# Patient Record
Sex: MALE | Race: WHITE | NOT HISPANIC OR LATINO | Employment: PART TIME | URBAN - METROPOLITAN AREA
[De-identification: names, ages, dates, MRNs, and addresses within clinical notes are randomized per-mention and may not be internally consistent; named-entity substitution may affect disease eponyms.]

---

## 2017-05-10 ENCOUNTER — APPOINTMENT (OUTPATIENT)
Dept: RADIOLOGY | Facility: MEDICAL CENTER | Age: 32
End: 2017-05-10
Attending: EMERGENCY MEDICINE
Payer: MEDICAID

## 2017-05-10 ENCOUNTER — HOSPITAL ENCOUNTER (EMERGENCY)
Facility: MEDICAL CENTER | Age: 32
End: 2017-05-10
Attending: EMERGENCY MEDICINE
Payer: MEDICAID

## 2017-05-10 VITALS
HEART RATE: 64 BPM | OXYGEN SATURATION: 98 % | SYSTOLIC BLOOD PRESSURE: 107 MMHG | TEMPERATURE: 98.9 F | WEIGHT: 155 LBS | HEIGHT: 65 IN | RESPIRATION RATE: 18 BRPM | DIASTOLIC BLOOD PRESSURE: 74 MMHG | BODY MASS INDEX: 25.83 KG/M2

## 2017-05-10 DIAGNOSIS — S49.91XA ARM INJURY, RIGHT, INITIAL ENCOUNTER: ICD-10-CM

## 2017-05-10 DIAGNOSIS — R56.9 SEIZURE (HCC): ICD-10-CM

## 2017-05-10 LAB
ALBUMIN SERPL BCP-MCNC: 4.1 G/DL (ref 3.2–4.9)
ALBUMIN/GLOB SERPL: 1.2 G/DL
ALP SERPL-CCNC: 82 U/L (ref 30–99)
ALT SERPL-CCNC: 108 U/L (ref 2–50)
ANION GAP SERPL CALC-SCNC: 10 MMOL/L (ref 0–11.9)
APTT PPP: 22.6 SEC (ref 24.7–36)
AST SERPL-CCNC: 54 U/L (ref 12–45)
BASOPHILS # BLD AUTO: 0.5 % (ref 0–1.8)
BASOPHILS # BLD: 0.05 K/UL (ref 0–0.12)
BILIRUB SERPL-MCNC: 0.6 MG/DL (ref 0.1–1.5)
BUN SERPL-MCNC: 14 MG/DL (ref 8–22)
CALCIUM SERPL-MCNC: 10.1 MG/DL (ref 8.5–10.5)
CHLORIDE SERPL-SCNC: 104 MMOL/L (ref 96–112)
CO2 SERPL-SCNC: 25 MMOL/L (ref 20–33)
CREAT SERPL-MCNC: 0.83 MG/DL (ref 0.5–1.4)
EOSINOPHIL # BLD AUTO: 0.07 K/UL (ref 0–0.51)
EOSINOPHIL NFR BLD: 0.7 % (ref 0–6.9)
ERYTHROCYTE [DISTWIDTH] IN BLOOD BY AUTOMATED COUNT: 38.4 FL (ref 35.9–50)
GFR SERPL CREATININE-BSD FRML MDRD: >60 ML/MIN/1.73 M 2
GLOBULIN SER CALC-MCNC: 3.5 G/DL (ref 1.9–3.5)
GLUCOSE SERPL-MCNC: 88 MG/DL (ref 65–99)
HCT VFR BLD AUTO: 47.7 % (ref 42–52)
HGB BLD-MCNC: 16.4 G/DL (ref 14–18)
IMM GRANULOCYTES # BLD AUTO: 0.04 K/UL (ref 0–0.11)
IMM GRANULOCYTES NFR BLD AUTO: 0.4 % (ref 0–0.9)
INR PPP: 0.85 (ref 0.87–1.13)
LYMPHOCYTES # BLD AUTO: 2.59 K/UL (ref 1–4.8)
LYMPHOCYTES NFR BLD: 27 % (ref 22–41)
MCH RBC QN AUTO: 30.7 PG (ref 27–33)
MCHC RBC AUTO-ENTMCNC: 34.4 G/DL (ref 33.7–35.3)
MCV RBC AUTO: 89.2 FL (ref 81.4–97.8)
MONOCYTES # BLD AUTO: 0.89 K/UL (ref 0–0.85)
MONOCYTES NFR BLD AUTO: 9.3 % (ref 0–13.4)
NEUTROPHILS # BLD AUTO: 5.95 K/UL (ref 1.82–7.42)
NEUTROPHILS NFR BLD: 62.1 % (ref 44–72)
NRBC # BLD AUTO: 0 K/UL
NRBC BLD AUTO-RTO: 0 /100 WBC
PLATELET # BLD AUTO: 242 K/UL (ref 164–446)
PMV BLD AUTO: 9.2 FL (ref 9–12.9)
POTASSIUM SERPL-SCNC: 3.9 MMOL/L (ref 3.6–5.5)
PROT SERPL-MCNC: 7.6 G/DL (ref 6–8.2)
PROTHROMBIN TIME: 11.9 SEC (ref 12–14.6)
RBC # BLD AUTO: 5.35 M/UL (ref 4.7–6.1)
SODIUM SERPL-SCNC: 139 MMOL/L (ref 135–145)
WBC # BLD AUTO: 9.6 K/UL (ref 4.8–10.8)

## 2017-05-10 PROCEDURE — 96374 THER/PROPH/DIAG INJ IV PUSH: CPT

## 2017-05-10 PROCEDURE — 73080 X-RAY EXAM OF ELBOW: CPT | Mod: RT

## 2017-05-10 PROCEDURE — 700111 HCHG RX REV CODE 636 W/ 250 OVERRIDE (IP): Performed by: EMERGENCY MEDICINE

## 2017-05-10 PROCEDURE — 85610 PROTHROMBIN TIME: CPT

## 2017-05-10 PROCEDURE — 85025 COMPLETE CBC W/AUTO DIFF WBC: CPT

## 2017-05-10 PROCEDURE — 80053 COMPREHEN METABOLIC PANEL: CPT

## 2017-05-10 PROCEDURE — 700105 HCHG RX REV CODE 258: Performed by: EMERGENCY MEDICINE

## 2017-05-10 PROCEDURE — 85730 THROMBOPLASTIN TIME PARTIAL: CPT

## 2017-05-10 PROCEDURE — 73030 X-RAY EXAM OF SHOULDER: CPT | Mod: RT

## 2017-05-10 PROCEDURE — 99284 EMERGENCY DEPT VISIT MOD MDM: CPT

## 2017-05-10 PROCEDURE — 96361 HYDRATE IV INFUSION ADD-ON: CPT

## 2017-05-10 RX ORDER — LORAZEPAM 2 MG/ML
2 INJECTION INTRAMUSCULAR ONCE
Status: COMPLETED | OUTPATIENT
Start: 2017-05-10 | End: 2017-05-10

## 2017-05-10 RX ORDER — TOPIRAMATE 100 MG/1
100 TABLET, FILM COATED ORAL 2 TIMES DAILY
Qty: 60 TAB | Refills: 0 | Status: SHIPPED | OUTPATIENT
Start: 2017-05-10 | End: 2017-11-01

## 2017-05-10 RX ORDER — SODIUM CHLORIDE 9 MG/ML
1000 INJECTION, SOLUTION INTRAVENOUS ONCE
Status: COMPLETED | OUTPATIENT
Start: 2017-05-10 | End: 2017-05-10

## 2017-05-10 RX ADMIN — SODIUM CHLORIDE 1000 ML: 9 INJECTION, SOLUTION INTRAVENOUS at 16:00

## 2017-05-10 RX ADMIN — LORAZEPAM 2 MG: 2 INJECTION INTRAMUSCULAR; INTRAVENOUS at 16:00

## 2017-05-10 ASSESSMENT — ENCOUNTER SYMPTOMS
CHILLS: 0
SEIZURES: 1
HEADACHES: 1
FEVER: 0

## 2017-05-10 ASSESSMENT — PAIN SCALES - GENERAL: PAINLEVEL_OUTOF10: 8

## 2017-05-10 ASSESSMENT — LIFESTYLE VARIABLES: DO YOU DRINK ALCOHOL: NO

## 2017-05-10 NOTE — DISCHARGE INSTRUCTIONS
Seizure, Adult  A seizure means there is unusual activity in the brain. A seizure can cause changes in attention or behavior. Seizures often cause shaking (convulsions). Seizures often last from 30 seconds to 2 minutes.  HOME CARE   · If you are given medicines, take them exactly as told by your doctor.  · Keep all doctor visits as told.  · Do not swim or drive until your doctor says it is okay.  · Teach others what to do if you have a seizure. They should:  ¨ Lay you on the ground.  ¨ Put a cushion under your head.  ¨ Loosen any tight clothing around your neck.  ¨ Turn you on your side.  ¨ Stay with you until you get better.  GET HELP RIGHT AWAY IF:   · The seizure lasts longer than 2 to 5 minutes.  · The seizure is very bad.  · The person does not wake up after the seizure.  · The person's attention or behavior changes.  Drive the person to the emergency room or call your local emergency services (911 in [place]  MAKE SURE YOU:   · Understand these instructions.  · Will watch your condition.  · Will get help right away if you are not doing well or get worse.     This information is not intended to replace advice given to you by your health care provider. Make sure you discuss any questions you have with your health care provider.     Document Released: 06/05/2009 Document Revised: 03/11/2013 Document Reviewed: 12/05/2012  Elsevier Interactive Patient Education ©2016 Elsevier Inc.

## 2017-05-10 NOTE — ED AVS SNAPSHOT
5/10/2017    Mahamed Alistair Mae  [address] [place] [address]    Dear Mahamed:    [facility] wants to ensure your discharge home is safe and you or your loved ones have had all of your questions answered regarding your care after you leave the hospital.    Below is a list of resources and contact information should you have any questions regarding your hospital stay, follow-up instructions, or active medical symptoms.    Questions or Concerns Regarding… Contact   Medical Questions Related to Your Discharge  (7 days a week, 8am-5pm) Contact a Nurse Care Coordinator   [phone]   Medical Questions Not Related to Your Discharge  (24 hours a day / 7 days a week)  Contact the Nurse Health Line   [phone]    Medications or Discharge Instructions Refer to your discharge packet   or contact your [facility] Primary Care Provider   [phone]   Follow-up Appointment(s) Schedule your appointment via [email or web address]   or contact Scheduling [phone]   Billing Review your statement via [email or web address]  or contact Billing [phone]   Medical Records Review your records via [email or web address]   or contact Medical Records [phone]     You may receive a telephone call within two days of discharge. This call is to make certain you understand your discharge instructions and have the opportunity to have any questions answered. You can also easily access your medical information, test results and upcoming appointments via the [email or web address] free online health management tool. You can learn more and sign up at [email or web address]. For assistance setting up your [email or web address] account, please call [phone].    Once again, we want to ensure your discharge home is safe and that you have a clear understanding of any next steps in your care. If you have any questions or concerns, please do not hesitate to contact us, we are here for you. Thank you for choosing [facility] for your healthcare needs.    Sincerely,    Your [facility] Healthcare Team

## 2017-05-10 NOTE — ED NOTES
Chief Complaint   Patient presents with   • Seizure     witnessed by bystanders, full body seizure x2 while walking. Hx of seizures, may have not have taken topamax this am.    • T-5000 Extremity Pain     right elbow, right hand and right knee pain. CMS intact.     More frequent episodes in past two weeks, has EEG scheduled this week. FSBS 172 per medics. VSS. Chart up for ERP.

## 2017-05-10 NOTE — ED AVS SNAPSHOT
Plutonium Paint Access Code: [email or web address]  Expires: 6/9/2017  4:46 PM    Your email address is not on file at [facility].  Email Addresses are required for you to sign up for Plutonium Paint, please contact [phone] to verify your personal information and to provide your email address prior to attempting to register for Plutonium Paint.    Mahamed Mae  [address] [place] [address]    Plutonium Paint  A secure, online tool to manage your health information     [facility]’s Plutonium Paint® is a secure, online tool that connects you to your personalized health information from the privacy of your home -- day or night - making it very easy for you to manage your healthcare. Once the activation process is completed, you can even access your medical information using the Plutonium Paint benjamin, which is available for free in the Apple Benjamin store or Google Play store.     To learn more about Plutonium Paint, visit [email or web address]    There are two levels of access available (as shown below):   My Chart Features  [facility] Primary Care Doctor [facility]  Specialists [facility]  Urgent  Care Non-[facility] Primary Care Doctor   Email your healthcare team securely and privately 24/7 X X X    Manage appointments: schedule your next appointment; view details of past/upcoming appointments X      Request prescription refills. X      View recent personal medical records, including lab and immunizations X X X X   View health record, including health history, allergies, medications X X X X   Read reports about your outpatient visits, procedures, consult and ER notes X X X X   See your discharge summary, which is a recap of your hospital and/or ER visit that includes your diagnosis, lab results, and care plan X X  X     How to register for Aireumt:  Once your e-mail address has been verified, follow the following steps to sign up for Aireumt.     1. Go to  [email or web address]  2. Click on the Sign Up Now box, which takes you to the New Member Sign Up page. You  will need to provide the following information:  a. Enter your "[employer] Access Code exactly as it appears at the top of this page. (You will not need to use this code after you’ve completed the sign-up process. If you do not sign up before the expiration date, you must request a new code.)   b. Enter your date of birth.   c. Enter your home email address.   d. Click Submit, and follow the next screen’s instructions.  3. Create a Terabitzt ID. This will be your "[employer] login ID and cannot be changed, so think of one that is secure and easy to remember.  4. Create a "[employer] password. You can change your password at any time.  5. Enter your Password Reset Question and Answer. This can be used at a later time if you forget your password.   6. Enter your e-mail address. This allows you to receive e-mail notifications when new information is available in "[employer].  7. Click Sign Up. You can now view your health information.    For assistance activating your "[employer] account, call [phone]

## 2017-05-10 NOTE — ED AVS SNAPSHOT
Home Care Instructions                                                                                                                Mahamed Mae   MRN: [id number]    Department:  [facility], Emergency Dept   Date of Visit:  5/10/2017            [facility], Emergency Dept    [address]    [place] [address]    Phone:  [phone]      You were seen by     Jaylan Hong M.D.      Your Diagnosis Was     Seizure (CMS-[facility])     R56.9       These are the medications you received during your hospitalization from 05/10/2017 1457 to 05/10/2017 1725     Date/Time Order Dose Route Action    05/10/2017 1600 NS infusion 1,000 mL 1,000 mL Intravenous New Bag    05/10/2017 1600 lorazepam (ATIVAN) injection 2 mg 2 mg Intravenous Given      Follow-up Information     1. Follow up with Your neurologist for recheck In 1 week.      Medication Information     Review all of your home medications and newly ordered medications with your primary doctor and/or pharmacist as soon as possible. Follow medication instructions as directed by your doctor and/or pharmacist.     Please keep your complete medication list with you and share with your physician. Update the information when medications are discontinued, doses are changed, or new medications (including over-the-counter products) are added; and carry medication information at all times in the event of emergency situations.               Medication List      START taking these medications        Instructions    Morning Afternoon Evening Bedtime    topiramate 100 MG Tabs   Commonly known as:  TOPAMAX        Take 1 Tab by mouth 2 times a day.   Dose:  100 mg                          ASK your doctor about these medications        Instructions    Morning Afternoon Evening Bedtime    alprazolam 0.25 MG Tabs   Commonly known as:  XANAX        Take 0.25 mg by mouth at bedtime as needed.   Dose:  0.25 mg                        doxycycline 100  MG Tabs   Commonly known as:  VIBRAMYCIN        Take 100 mg by mouth 2 times a day.   Dose:  100 mg                        * hydrocodone-acetaminophen 5-325 MG Tabs per tablet   Commonly known as:  NORCO        Take 1-2 Tabs by mouth every four hours as needed.   Dose:  1-2 Tab                        * hydrocodone-acetaminophen 5-325 MG Tabs per tablet   Commonly known as:  NORCO        Take 1-2 Tabs by mouth every 6 hours as needed.   Dose:  1-2 Tab                        venlafaxine 37.5 MG Tabs   Commonly known as:  EFFEXOR        Take 75 mg by mouth 3 times a day.   Dose:  75 mg                        * Notice:  This list has 2 medication(s) that are the same as other medications prescribed for you. Read the directions carefully, and ask your doctor or other care provider to review them with you.         Where to Get Your Medications      You can get these medications from any pharmacy     Bring a paper prescription for each of these medications    - topiramate 100 MG Tabs            Procedures and tests performed during your visit     APTT    CBC WITH DIFFERENTIAL    COMP METABOLIC PANEL    DX-ELBOW-COMPLETE 3+ RIGHT    DX-SHOULDER 2+ RIGHT    ESTIMATED GFR    IV Saline Lock    PROTHROMBIN TIME        Discharge Instructions       Seizure, Adult  A seizure means there is unusual activity in the brain. A seizure can cause changes in attention or behavior. Seizures often cause shaking (convulsions). Seizures often last from 30 seconds to 2 minutes.  HOME CARE   · If you are given medicines, take them exactly as told by your doctor.  · Keep all doctor visits as told.  · Do not swim or drive until your doctor says it is okay.  · Teach others what to do if you have a seizure. They should:  ¨ Lay you on the ground.  ¨ Put a cushion under your head.  ¨ Loosen any tight clothing around your neck.  ¨ Turn you on your side.  ¨ Stay with you until you get better.  GET HELP RIGHT AWAY IF:   · The seizure lasts longer than 2  to 5 minutes.  · The seizure is very bad.  · The person does not wake up after the seizure.  · The person's attention or behavior changes.  Drive the person to the emergency room or call your local emergency services (911 in [place]  MAKE SURE YOU:   · Understand these instructions.  · Will watch your condition.  · Will get help right away if you are not doing well or get worse.     This information is not intended to replace advice given to you by your health care provider. Make sure you discuss any questions you have with your health care provider.     Document Released: 06/05/2009 Document Revised: 03/11/2013 Document Reviewed: 12/05/2012  wedgies Interactive Patient Education ©2016 [employer]gies Inc.            Patient Information     Patient Information    Following emergency treatment: all patient requiring follow-up care must return either to a private physician or a clinic if your condition worsens before you are able to obtain further medical attention, please return to the emergency room.     Billing Information    At [facility], we work to make the billing process streamlined for our patients.  Our Representatives are here to answer any questions you may have regarding your hospital bill.  If you have insurance coverage and have supplied your insurance information to us, we will submit a claim to your insurer on your behalf.  Should you have any questions regarding your bill, we can be reached online or by phone as follows:  Online: You are able pay your bills online or live chat with our representatives about any billing questions you may have. We are here to help Monday - Friday from 8:00am to 7:30pm and 9:00am - 12:00pm on Saturdays.  Please visit [email or web address]  for more information.   Phone:  [phone] or [phone]    Please note that your emergency physician, surgeon, pathologist, radiologist, anesthesiologist, and other specialists are not employed by  [facility] and will therefore bill separately for their services.  Please contact them directly for any questions concerning their bills at the numbers below:     Emergency Physician Services:  [phone]  [facility]:  [phone]  [facility]:  [phone]  [facility]:  [phone]    1. Your final bill may vary from the amount quoted upon discharge if all procedures are not complete at that time, or if your doctor has additional procedures of which we are not aware. You will receive an additional bill if you return to the Emergency Department at [facility] for suture removal regardless of the facility of which the sutures were placed.     2. Please arrange for settlement of this account at the emergency registration.    3. All self-pay accounts are due in full at the time of treatment.  If you are unable to meet this obligation then payment is expected within 4-5 days.     4. If you have had radiology studies (CT, X-ray, Ultrasound, MRI), you have received a preliminary result during your emergency department visit. Please contact the radiology department [phone] to receive a copy of your final result. Please discuss the Final result with your primary physician or with the follow up physician provided.     Crisis Hotline:  [place] Crisis Hotline:  0-604-QCBZIDJ or 1-727.755.3470  [place] Crisis Hotline:    1-273.838.3361 or 582-753-8535         ED Discharge Follow Up Questions    1. In order to provide you with very good care, we would like to follow up with a phone call in the next few days.  May we have your permission to contact you?     YES /  NO    2. What is the best phone number to call you? (       )_____-__________    3. What is the best time to call you?      Morning  /  Afternoon  /  Evening                   Patient Signature:  ____________________________________________________________    Date:   ____________________________________________________________

## 2017-05-11 NOTE — ED NOTES
DC paperwork reviewed and signed.  1 prescription(s) given to patient. Pt denies any further questions at this time; pt ambulated independently to lobby with significant other/family

## 2017-05-16 ENCOUNTER — APPOINTMENT (OUTPATIENT)
Dept: RADIOLOGY | Facility: MEDICAL CENTER | Age: 32
End: 2017-05-16
Attending: EMERGENCY MEDICINE
Payer: MEDICAID

## 2017-05-16 ENCOUNTER — HOSPITAL ENCOUNTER (EMERGENCY)
Facility: MEDICAL CENTER | Age: 32
End: 2017-05-16
Attending: EMERGENCY MEDICINE
Payer: MEDICAID

## 2017-05-16 VITALS
BODY MASS INDEX: 26.33 KG/M2 | HEART RATE: 59 BPM | OXYGEN SATURATION: 96 % | RESPIRATION RATE: 14 BRPM | SYSTOLIC BLOOD PRESSURE: 111 MMHG | HEIGHT: 65 IN | WEIGHT: 158 LBS | DIASTOLIC BLOOD PRESSURE: 72 MMHG

## 2017-05-16 DIAGNOSIS — R56.9 SEIZURE (HCC): ICD-10-CM

## 2017-05-16 LAB
ANION GAP SERPL CALC-SCNC: 7 MMOL/L (ref 0–11.9)
BUN SERPL-MCNC: 19 MG/DL (ref 8–22)
CALCIUM SERPL-MCNC: 9.2 MG/DL (ref 8.5–10.5)
CHLORIDE SERPL-SCNC: 110 MMOL/L (ref 96–112)
CO2 SERPL-SCNC: 20 MMOL/L (ref 20–33)
CREAT SERPL-MCNC: 1.06 MG/DL (ref 0.5–1.4)
GFR SERPL CREATININE-BSD FRML MDRD: >60 ML/MIN/1.73 M 2
GLUCOSE SERPL-MCNC: 80 MG/DL (ref 65–99)
POTASSIUM SERPL-SCNC: 4.1 MMOL/L (ref 3.6–5.5)
SODIUM SERPL-SCNC: 137 MMOL/L (ref 135–145)

## 2017-05-16 PROCEDURE — 70450 CT HEAD/BRAIN W/O DYE: CPT

## 2017-05-16 PROCEDURE — 72125 CT NECK SPINE W/O DYE: CPT

## 2017-05-16 PROCEDURE — 80048 BASIC METABOLIC PNL TOTAL CA: CPT

## 2017-05-16 PROCEDURE — 72070 X-RAY EXAM THORAC SPINE 2VWS: CPT

## 2017-05-16 PROCEDURE — 99284 EMERGENCY DEPT VISIT MOD MDM: CPT

## 2017-05-16 PROCEDURE — 700105 HCHG RX REV CODE 258: Performed by: EMERGENCY MEDICINE

## 2017-05-16 RX ORDER — METHADONE HYDROCHLORIDE 40 MG/1
45 TABLET ORAL DAILY
COMMUNITY
End: 2017-11-01

## 2017-05-16 RX ORDER — LORAZEPAM 2 MG/ML
1 INJECTION INTRAMUSCULAR ONCE
Status: DISCONTINUED | OUTPATIENT
Start: 2017-05-16 | End: 2017-05-16

## 2017-05-16 RX ORDER — ZIPRASIDONE HYDROCHLORIDE 40 MG/1
40 CAPSULE ORAL 2 TIMES DAILY
COMMUNITY
End: 2017-06-21

## 2017-05-16 RX ORDER — SODIUM CHLORIDE 9 MG/ML
1000 INJECTION, SOLUTION INTRAVENOUS ONCE
Status: COMPLETED | OUTPATIENT
Start: 2017-05-16 | End: 2017-05-16

## 2017-05-16 RX ORDER — DIAZEPAM 5 MG/ML
5 INJECTION, SOLUTION INTRAMUSCULAR; INTRAVENOUS ONCE
Status: DISCONTINUED | OUTPATIENT
Start: 2017-05-16 | End: 2017-05-16

## 2017-05-16 RX ADMIN — SODIUM CHLORIDE 1000 ML: 9 INJECTION, SOLUTION INTRAVENOUS at 17:31

## 2017-05-16 ASSESSMENT — PAIN SCALES - GENERAL
PAINLEVEL_OUTOF10: 7
PAINLEVEL_OUTOF10: 7

## 2017-05-16 NOTE — ED PROVIDER NOTES
"ED Provider Note    Scribed for Leighton Sanford M.D. by Vaishali Rose. 5/16/2017, 4:48 PM.    Primary care provider: Pcp Not In Computer  Means of arrival: walk in   History obtained from: patient   History limited by: none     CHIEF COMPLAINT  Chief Complaint   Patient presents with   • Seizure       HPI  Mahamed Mae is a 32 y.o. male with a history of frequent seizures who presents to the Emergency Department in a cervical collar after having an unwitnessed seizure that occurred just prior to arrival. The patient states he was walking down a street when his seizure occurred. He denies urinary incontinence and oral trauma. Patient complains of associated pain between his shoulder blades. Patient takes Topamax for his history of seizures.       REVIEW OF SYSTEMS  Pertinent positives include seizure, back pain.   Pertinent negatives include no urinary incontinence and oral trauma.    All other systems reviewed and negative. C.       PAST MEDICAL HISTORY   has a past medical history of Psychiatric disorder.      SURGICAL HISTORY   has past surgical history that includes hand surgery.      SOCIAL HISTORY  Social History   Substance Use Topics   • Smoking status: Never Smoker    • Smokeless tobacco: Current User     Types: Chew   • Alcohol Use: No      History   Drug Use No       FAMILY HISTORY  History reviewed. No pertinent family history.      CURRENT MEDICATIONS  Home Medications     **Home medications have not yet been reviewed for this encounter**          ALLERGIES  Allergies   Allergen Reactions   • Sulfa Drugs Anaphylaxis and Hives       PHYSICAL EXAM  VITAL SIGNS: /72 mmHg  Pulse 74  Resp 12  Ht 1.651 m (5' 5\")  Wt 71.668 kg (158 lb)  BMI 26.29 kg/m2  Constitutional: Well developed, Well nourished, No acute distress, Non-toxic appearance.   HENT: Normocephalic, Atraumatic, TMs normal, mucous membranes moist, no erythema, exudates, swelling, or masses, nares patent. No tongue laceration.   Eyes: " nonicteric. PERRL.  Neck: Mild diffuse C spine tenderness. In Cervical collar. Supple, no meningismus  Lymphatic: No lymphadenopathy noted.   Cardiovascular: Regular rate and rhythm, no gallops rubs or murmurs  Lungs: Clear bilaterally   Abdomen: Bowel sounds normal, Soft, No tenderness  Skin: Warm, Dry, no rash  Back: Mild T spine tenderness to palpation with no step offs.  No CVA tenderness.   Genitalia: Deferred  Rectal: Deferred  Extremities: No edema  Neurologic: Slightly somnolent but arouses easily, alert and oriented, appropriate, follows commands, moving all extremities equally, normal speech   Psychiatric: Affect normal        DIAGNOSTIC STUDIES / PROCEDURES  LABS  Results for orders placed or performed during the hospital encounter of 05/16/17   BASIC METABOLIC PANEL   Result Value Ref Range    Sodium 137 135 - 145 mmol/L    Potassium 4.1 3.6 - 5.5 mmol/L    Chloride 110 96 - 112 mmol/L    Co2 20 20 - 33 mmol/L    Glucose 80 65 - 99 mg/dL    Bun 19 8 - 22 mg/dL    Creatinine 1.06 0.50 - 1.40 mg/dL    Calcium 9.2 8.5 - 10.5 mg/dL    Anion Gap 7.0 0.0 - 11.9   ESTIMATED GFR   Result Value Ref Range    GFR If African American >60 >60 mL/min/1.73 m 2    GFR If Non African American >60 >60 mL/min/1.73 m 2   All labs reviewed by me.        RADIOLOGY  CT-CSPINE WITHOUT PLUS RECONS   Final Result      No fracture or subluxation is identified.      DX-THORACIC SPINE-2 VIEWS   Final Result      Unremarkable thoracic spine.      CT-HEAD W/O   Final Result      No acute intracranial abnormality is identified.      The radiologist's interpretation of all radiological studies have been reviewed by me.      COURSE & MEDICAL DECISION MAKING  Nursing notes, VS, PMSFHx reviewed in chart.     4:48 PM Patient seen and examined at bedside.  Ordered for CT C spine, CT head, DX thoracic and BMPto evaluate. Patient was treated with Keppra 500 mg  for his symptoms.     6:07 PM Patient reevaluated at bedside. The patient became  agitated and pulled off his cervical collar. Nursing report the patient appeared to have a pseudoseizure with no post ictal period. Patient had no oral trauma or urinary incontinence.     7:25 PM The patient eloped at this time prior to discussion of his lab and imaging results.       Decision Making:  This is a 32 y.o. year old male who presents with an apparent seizure. Here in the department he had an additional episode that was likely a pseudoseizure-it was not witnessed by myself but by his bedside nurse. The patient had images of his head and neck and T-spine which were all normal. After these were obtained the patient eloped prior to discussion of results. It is also noted that his electrolytes are normal. We were unable to give the patient discharge instructions because he eloped prior to this part of the ER stay.    Patient came back for his discharge instructions and I explained the results and advised him to follow-up for his EEG. He will start his Topamax after that and in the interim he'll not be involved in any dangerous activities such as driving etc.     The patient will return for new or worsening symptoms and is stable at the time of discharge.    The patient is referred to a primary physician for blood pressure management, diabetic screening, and for all other preventative health concerns.      DISPOSITION:  Patient eloped in stable condition.       FOLLOW UP:  No follow-up provider specified.      OUTPATIENT MEDICATIONS:  New Prescriptions    No medications on file       FINAL IMPRESSION  1. Seizure (CMS-[facility])           Vaishali GARCIA), am scribing for, and in the presence of, Legihton Sanford M.D..  Electronically signed by: Vaishali Viramontes), 5/16/2017  Leighton GARCIA M.D. personally performed the services described in this documentation, as scribed by Vaishali Rose in my presence, and it is both accurate and complete.    The note accurately reflects work and decisions made by me.  Leighton  Juvenal  5/16/2017  7:32 PM

## 2017-05-16 NOTE — ED AVS SNAPSHOT
Ubisense Access Code: [email or web address]  Expires: 6/9/2017  4:46 PM    Your email address is not on file at [facility].  Email Addresses are required for you to sign up for Ubisense, please contact [phone] to verify your personal information and to provide your email address prior to attempting to register for Ubisense.    Mahamed Mae  [address] [place] [address]    Ubisense  A secure, online tool to manage your health information     [facility] [employer] [facility]’s Ubisense® is a secure, online tool that connects you to your personalized health information from the privacy of your home -- day or night - making it very easy for you to manage your healthcare. Once the activation process is completed, you can even access your medical information using the Ubisense benjamin, which is available for free in the Apple Benjamin store or Google Play store.     To learn more about Ubisense, visit [email or web address]    There are two levels of access available (as shown below):   My Chart Features  [facility] Primary Care Doctor [facility]  Specialists [facility]  Urgent  Care Non-[facility] Primary Care Doctor   Email your healthcare team securely and privately 24/7 X X X    Manage appointments: schedule your next appointment; view details of past/upcoming appointments X      Request prescription refills. X      View recent personal medical records, including lab and immunizations X X X X   View health record, including health history, allergies, medications X X X X   Read reports about your outpatient visits, procedures, consult and ER notes X X X X   See your discharge summary, which is a recap of your hospital and/or ER visit that includes your diagnosis, lab results, and care plan X X  X     How to register for Blink Bookingt:  Once your e-mail address has been verified, follow the following steps to sign up for Blink Bookingt.     1. Go to  [email or web address]  2. Click on the Sign Up Now box, which takes you to the New Member Sign Up page. You  will need to provide the following information:  a. Enter your Reframed.tv Access Code exactly as it appears at the top of this page. (You will not need to use this code after you’ve completed the sign-up process. If you do not sign up before the expiration date, you must request a new code.)   b. Enter your date of birth.   c. Enter your home email address.   d. Click Submit, and follow the next screen’s instructions.  3. Create a H5t ID. This will be your Reframed.tv login ID and cannot be changed, so think of one that is secure and easy to remember.  4. Create a Reframed.tv password. You can change your password at any time.  5. Enter your Password Reset Question and Answer. This can be used at a later time if you forget your password.   6. Enter your e-mail address. This allows you to receive e-mail notifications when new information is available in Reframed.tv.  7. Click Sign Up. You can now view your health information.    For assistance activating your Reframed.tv account, call [phone]

## 2017-05-16 NOTE — ED AVS SNAPSHOT
Home Care Instructions                                                                                                                Mahamed Mae   MRN: [id number]    Department:  [facility], Emergency Dept   Date of Visit:  5/16/2017            [facility], Emergency Dept    [address]    [place] [address]    Phone:  [phone]      You were seen by     Leighton Sanford M.D.      Your Diagnosis Was     Seizure (CMS-HCC)     R56.9       These are the medications you received during your hospitalization from 05/16/2017 1629 to 05/16/2017 1954     Date/Time Order Dose Route Action    05/16/2017 1731 NS infusion 1,000 mL 1,000 mL Intravenous New Bag    05/16/2017 1815 levetiracetam (KEPPRA) 500 mg in D5W 100 mL IVPB 500 mg Intravenous Refused      Medication Information     Review all of your home medications and newly ordered medications with your primary doctor and/or pharmacist as soon as possible. Follow medication instructions as directed by your doctor and/or pharmacist.     Please keep your complete medication list with you and share with your physician. Update the information when medications are discontinued, doses are changed, or new medications (including over-the-counter products) are added; and carry medication information at all times in the event of emergency situations.               Medication List      ASK your doctor about these medications        Instructions    Morning Afternoon Evening Bedtime    methadone 40 MG Tbso   Commonly known as:  DOLOPHINE        Take 40 mg by mouth.   Dose:  40 mg                        topiramate 100 MG Tabs   Commonly known as:  TOPAMAX        Take 1 Tab by mouth 2 times a day.   Dose:  100 mg                        ziprasidone 40 MG Caps   Commonly known as:  GEODON        Take 40 mg by mouth 2 Times a Day.   Dose:  40 mg                                Procedures and tests performed during your visit     BASIC METABOLIC  PANEL    CT-CSPINE WITHOUT PLUS RECONS    CT-HEAD W/O    DX-THORACIC SPINE-2 VIEWS    ESTIMATED GFR        Discharge Instructions       Epilepsy  People with epilepsy have times when they shake and jerk uncontrollably (seizures). This happens when there is a sudden change in brain function. Epilepsy may have many possible causes. Anything that disturbs the normal pattern of brain cell activity can lead to seizures.  HOME CARE   · Follow your doctor's instructions about driving and safety during normal activities.  · Get enough sleep.  · Only take medicine as told by your doctor.  · Avoid things that you know can cause you to have seizures (triggers).  · Write down when your seizures happen and what you remember about each seizure. Write down anything you think may have caused the seizure to happen.  · Tell the people you live and work with that you have seizures. Make sure they know how to help you. They should:  ¨ Cushion your head and body.  ¨ Turn you on your side.  ¨ Not restrain you.  ¨ Not place anything inside your mouth.  ¨ Call for local emergency medical help if there is any question about what has happened.  · Keep all follow-up visits with your doctor. This is very important.  GET HELP IF:  · You get an infection or start to feel sick. You may have more seizures when you are sick.  · You are having seizures more often.  · Your seizure pattern is changing.  GET HELP RIGHT AWAY IF:   · A seizure does not stop after a few seconds or minutes.  · A seizure causes you to have trouble breathing.  · A seizure gives you a very bad headache.  · A seizure makes you unable to speak or use a part of your body.     This information is not intended to replace advice given to you by your health care provider. Make sure you discuss any questions you have with your health care provider.     Document Released: 10/15/2010 Document Revised: 10/08/2014 Document Reviewed: 07/30/2014  [employer] Interactive Patient Education  ©2016 Elsevier Inc.            Patient Information     Patient Information    Following emergency treatment: all patient requiring follow-up care must return either to a private physician or a clinic if your condition worsens before you are able to obtain further medical attention, please return to the emergency room.     Billing Information    At [facility], we work to make the billing process streamlined for our patients.  Our Representatives are here to answer any questions you may have regarding your hospital bill.  If you have insurance coverage and have supplied your insurance information to us, we will submit a claim to your insurer on your behalf.  Should you have any questions regarding your bill, we can be reached online or by phone as follows:  Online: You are able pay your bills online or live chat with our representatives about any billing questions you may have. We are here to help Monday - Friday from 8:00am to 7:30pm and 9:00am - 12:00pm on Saturdays.  Please visit [email or web address]  for more information.   Phone:  [phone] or [phone]    Please note that your emergency physician, surgeon, pathologist, radiologist, anesthesiologist, and other specialists are not employed by [facility] and will therefore bill separately for their services.  Please contact them directly for any questions concerning their bills at the numbers below:     Emergency Physician Services:  [phone]  [facility]:  [phone]  [facility]:  [phone]  [facility]:  [phone]    1. Your final bill may vary from the amount quoted upon discharge if all procedures are not complete at that time, or if your doctor has additional procedures of which we are not aware. You will receive an additional bill if you return to the Emergency Department at [facility] for suture removal regardless of the facility of which the sutures were  placed.     2. Please arrange for settlement of this account at the emergency registration.    3. All self-pay accounts are due in full at the time of treatment.  If you are unable to meet this obligation then payment is expected within 4-5 days.     4. If you have had radiology studies (CT, X-ray, Ultrasound, MRI), you have received a preliminary result during your emergency department visit. Please contact the radiology department [phone] to receive a copy of your final result. Please discuss the Final result with your primary physician or with the follow up physician provided.     Crisis Hotline:  [place] Crisis Hotline:  7-169-WRACHZA or 1-905.337.2497  [place] Crisis Hotline:    1-161.715.1693 or 002-163-5126         ED Discharge Follow Up Questions    1. In order to provide you with very good care, we would like to follow up with a phone call in the next few days.  May we have your permission to contact you?     YES /  NO    2. What is the best phone number to call you? (       )_____-__________    3. What is the best time to call you?      Morning  /  Afternoon  /  Evening                   Patient Signature:  ____________________________________________________________    Date:  ____________________________________________________________

## 2017-05-16 NOTE — ED NOTES
"Chief Complaint   Patient presents with   • Seizure   [place] [facility] [place] [facility] for unwitnessed sz with head and neck pain. Pt in c-collar. No oral trauma or incontinence. Pt AAO x4,  but slow to answer. Pupils TO. Photophobia and requesting lights off. Pt reports hx of sz with EEG scheduled Saturday. Pt moves all extremities. Took all prescribes medications today as prescribed.  Blood pressure 111/72, pulse 74, resp. rate 12, height 1.651 m (5' 5\"), weight 71.668 kg (158 lb).    "

## 2017-05-16 NOTE — ED AVS SNAPSHOT
5/16/2017    Mahamed Alistair Mae  [address] [place] [address]    Dear Mahamed:    [facility] wants to ensure your discharge home is safe and you or your loved ones have had all of your questions answered regarding your care after you leave the hospital.    Below is a list of resources and contact information should you have any questions regarding your hospital stay, follow-up instructions, or active medical symptoms.    Questions or Concerns Regarding… Contact   Medical Questions Related to Your Discharge  (7 days a week, 8am-5pm) Contact a Nurse Care Coordinator   [phone]   Medical Questions Not Related to Your Discharge  (24 hours a day / 7 days a week)  Contact the Nurse Health Line   [phone]    Medications or Discharge Instructions Refer to your discharge packet   or contact your [facility] Primary Care Provider   [phone]   Follow-up Appointment(s) Schedule your appointment via [employer]zoo [employer]   or contact Scheduling [phone]   Billing Review your statement via [employer]  or contact Billing [phone]   Medical Records Review your records via [employer]   or contact Medical Records [phone]     You may receive a telephone call within two days of discharge. This call is to make certain you understand your discharge instructions and have the opportunity to have any questions answered. You can also easily access your medical information, test results and upcoming appointments via the Phone[employer] free online health management tool. You can learn more and sign up at [employer]oo Communications. For assistance setting up your [employer] account, please call [phone].    Once again, we want to ensure your discharge home is safe and that you have a clear understanding of any next steps in your care. If you have any questions or concerns, please do not hesitate to contact us, we are here for you. Thank you for choosing [facility] for your healthcare needs.    Sincerely,    Your [facility] Healthcare Team

## 2017-05-17 NOTE — DISCHARGE INSTRUCTIONS
Epilepsy  People with epilepsy have times when they shake and jerk uncontrollably (seizures). This happens when there is a sudden change in brain function. Epilepsy may have many possible causes. Anything that disturbs the normal pattern of brain cell activity can lead to seizures.  HOME CARE   · Follow your doctor's instructions about driving and safety during normal activities.  · Get enough sleep.  · Only take medicine as told by your doctor.  · Avoid things that you know can cause you to have seizures (triggers).  · Write down when your seizures happen and what you remember about each seizure. Write down anything you think may have caused the seizure to happen.  · Tell the people you live and work with that you have seizures. Make sure they know how to help you. They should:  ¨ Cushion your head and body.  ¨ Turn you on your side.  ¨ Not restrain you.  ¨ Not place anything inside your mouth.  ¨ Call for local emergency medical help if there is any question about what has happened.  · Keep all follow-up visits with your doctor. This is very important.  GET HELP IF:  · You get an infection or start to feel sick. You may have more seizures when you are sick.  · You are having seizures more often.  · Your seizure pattern is changing.  GET HELP RIGHT AWAY IF:   · A seizure does not stop after a few seconds or minutes.  · A seizure causes you to have trouble breathing.  · A seizure gives you a very bad headache.  · A seizure makes you unable to speak or use a part of your body.     This information is not intended to replace advice given to you by your health care provider. Make sure you discuss any questions you have with your health care provider.     Document Released: 10/15/2010 Document Revised: 10/08/2014 Document Reviewed: 07/30/2014  fitaborate Interactive Patient Education ©2016 fitaborate Inc.

## 2017-05-17 NOTE — ED NOTES
ERP notified pt left hospital from 38 and is now back in room. ERP notified, Pt removed PIV and no signs of bleeding.

## 2017-05-17 NOTE — ED NOTES
..Pt verbalized understanding of discharge and follow up instructions. PIV removed.  VSS.  All questions answered, ambulates with steady gait to discharge.

## 2017-05-17 NOTE — ED NOTES
"Security at  for safety update. Pt and wife compliant and understanding. Pt verbally apologetic to this RN about his \"postictal behavior\". Pt compliant to have XR and CT scan. Refused ordered medication due to the medication contraindicated for EEG on Saturday. ERP aware. AAO x4 and VSS.   "

## 2017-05-17 NOTE — ED NOTES
Anthony padded with soft blankets. Witnessed sz 45 seconds. Airway patient at all times. ERP notified and to room. No hypoxia. Pt follows commands. Wife reports pt is violent after his seizures and is at BS to comfort pt. Suction device set up, but not required. No incontinence of urine or oral trauma. Pt revolved his c-callar and ERP aware. VSS.

## 2017-05-26 ENCOUNTER — NON-PROVIDER VISIT (OUTPATIENT)
Dept: URGENT CARE | Facility: PHYSICIAN GROUP | Age: 32
End: 2017-05-26

## 2017-05-26 DIAGNOSIS — Z02.1 PRE-EMPLOYMENT DRUG SCREENING: ICD-10-CM

## 2017-05-26 LAB
AMP AMPHETAMINE: NORMAL
COC COCAINE: NORMAL
INT CON NEG: NEGATIVE
INT CON POS: POSITIVE
MET METHAMPHETAMINES: NORMAL
OPI OPIATES: NORMAL
PCP PHENCYCLIDINE: NORMAL
POC DRUG COMMENT 753798-OCCUPATIONAL HEALTH: NORMAL
THC: NORMAL

## 2017-05-26 PROCEDURE — 80305 DRUG TEST PRSMV DIR OPT OBS: CPT | Performed by: PHYSICIAN ASSISTANT

## 2017-06-21 ENCOUNTER — HOSPITAL ENCOUNTER (EMERGENCY)
Facility: MEDICAL CENTER | Age: 32
End: 2017-06-21
Attending: EMERGENCY MEDICINE
Payer: MEDICAID

## 2017-06-21 VITALS
RESPIRATION RATE: 10 BRPM | WEIGHT: 155 LBS | HEART RATE: 64 BPM | DIASTOLIC BLOOD PRESSURE: 57 MMHG | SYSTOLIC BLOOD PRESSURE: 104 MMHG | OXYGEN SATURATION: 98 % | BODY MASS INDEX: 25.83 KG/M2 | HEIGHT: 65 IN | TEMPERATURE: 98.7 F

## 2017-06-21 DIAGNOSIS — G40.919 BREAKTHROUGH SEIZURE (HCC): ICD-10-CM

## 2017-06-21 LAB — GLUCOSE BLD-MCNC: 71 MG/DL (ref 65–99)

## 2017-06-21 PROCEDURE — 96374 THER/PROPH/DIAG INJ IV PUSH: CPT

## 2017-06-21 PROCEDURE — 700111 HCHG RX REV CODE 636 W/ 250 OVERRIDE (IP): Performed by: EMERGENCY MEDICINE

## 2017-06-21 PROCEDURE — 82962 GLUCOSE BLOOD TEST: CPT

## 2017-06-21 PROCEDURE — 99284 EMERGENCY DEPT VISIT MOD MDM: CPT

## 2017-06-21 PROCEDURE — 93005 ELECTROCARDIOGRAM TRACING: CPT | Performed by: EMERGENCY MEDICINE

## 2017-06-21 RX ORDER — HYDROXYZINE 50 MG/1
50 TABLET, FILM COATED ORAL 2 TIMES DAILY
COMMUNITY
End: 2017-11-01

## 2017-06-21 RX ORDER — LORAZEPAM 2 MG/ML
1 INJECTION INTRAMUSCULAR ONCE
Status: COMPLETED | OUTPATIENT
Start: 2017-06-21 | End: 2017-06-21

## 2017-06-21 RX ORDER — LEVETIRACETAM 500 MG/1
500 TABLET ORAL 2 TIMES DAILY
COMMUNITY
End: 2017-09-20

## 2017-06-21 RX ORDER — LITHIUM CARBONATE 450 MG
500 TABLET, EXTENDED RELEASE ORAL 3 TIMES DAILY
COMMUNITY
End: 2017-11-01

## 2017-06-21 RX ADMIN — LORAZEPAM 1 MG: 2 INJECTION INTRAMUSCULAR; INTRAVENOUS at 14:12

## 2017-06-21 ASSESSMENT — PAIN SCALES - GENERAL: PAINLEVEL_OUTOF10: 0

## 2017-06-21 NOTE — ED NOTES
Pt ambulated around pod with steady gait, pt VSS. Family at bedside, daughters are taking him home.

## 2017-06-21 NOTE — ED NOTES
Pt changed into dry clean shirt, was ambulatory to triage with steady gait with family, pt A&Ox4, discharge instructions given affirmation received. Pt driven home by daughters.

## 2017-06-21 NOTE — ED AVS SNAPSHOT
Klarna Access Code: [email or web address]  Expires: 7/21/2017  4:19 PM    Your email address is not on file at [facility].  Email Addresses are required for you to sign up for Klarna, please contact [phone] to verify your personal information and to provide your email address prior to attempting to register for Klarna.    Mahamed Mae  [address] [place] [address]    Klarna  A secure, online tool to manage your health information     [employer]’s Klarna® is a secure, online tool that connects you to your personalized health information from the privacy of your home -- day or night - making it very easy for you to manage your healthcare. Once the activation process is completed, you can even access your medical information using the Klarna benjamin, which is available for free in the Apple Benjamin store or Google Play store.     To learn more about Klarna, visit [email or web address]    There are two levels of access available (as shown below):   My Chart Features  [facility] Primary Care Doctor [facility]  Specialists [facility]  Urgent  Care Non-[facility] Primary Care Doctor   Email your healthcare team securely and privately 24/7 X X X    Manage appointments: schedule your next appointment; view details of past/upcoming appointments X      Request prescription refills. X      View recent personal medical records, including lab and immunizations X X X X   View health record, including health history, allergies, medications X X X X   Read reports about your outpatient visits, procedures, consult and ER notes X X X X   See your discharge summary, which is a recap of your hospital and/or ER visit that includes your diagnosis, lab results, and care plan X X  X     How to register for Anchantot:  Once your e-mail address has been verified, follow the following steps to sign up for Klarna.     1. Go to  [email or web address]  2. Click on the Sign Up Now box, which takes you to the New Member Sign Up page. You  will need to provide the following information:  a. Enter your Bionostra Access Code exactly as it appears at the top of this page. (You will not need to use this code after you’ve completed the sign-up process. If you do not sign up before the expiration date, you must request a new code.)   b. Enter your date of birth.   c. Enter your home email address.   d. Click Submit, and follow the next screen’s instructions.  3. Create a TripleGiftt ID. This will be your Bionostra login ID and cannot be changed, so think of one that is secure and easy to remember.  4. Create a Bionostra password. You can change your password at any time.  5. Enter your Password Reset Question and Answer. This can be used at a later time if you forget your password.   6. Enter your e-mail address. This allows you to receive e-mail notifications when new information is available in Bionostra.  7. Click Sign Up. You can now view your health information.    For assistance activating your Bionostra account, call [phone]

## 2017-06-21 NOTE — ED PROVIDER NOTES
ED Provider Note    Scribed for Juan David M.D. by Vania Gilbert. 6/21/2017  2:01 PM    Primary care provider: Pcp Not In Computer  Means of arrival: EMS  History obtained from: Patient  History limited by: None     CHIEF COMPLAINT  Witnessed seizure    HPI  Mahamed Mae is a 32 y.o. male who presents to the ED by ambulance following a seizure which occurred just prior to arrival. The patient was missing at work so when his co-workers went to find him they found him actively seizing in the bathroom. The amount of the the patient was seizing is unknown but is approximated to be roughly 10 minutes of seizure activity. Upon the arrival of EMS, they also witnessed the patient actively seizing. Patient was post ictal en route to the hospital and appears to be fatigued with complaints of lower extremity weakness, per patient. He takes Topamax and another unknown antiepileptic and has reportedly not missed any doses. He complains of abdominal pain. Patient denies biting his tongue or urinary incontinence.     REVIEW OF SYSTEMS  Pertinent positives include: Seizure, postictal phase.  Pertinent negatives include: Tongue laceration, incontinence, vomiting, diarrhea.  10+ systems reviewed and negative.      PAST MEDICAL HISTORY  Past Medical History   Diagnosis Date   • Psychiatric disorder      bipolar     SOCIAL HISTORY  Social History   Substance Use Topics   • Smoking status: Never Smoker    • Smokeless tobacco: Current User     Types: Chew   • Alcohol Use: No     History   Drug Use No       SURGICAL HISTORY  Past Surgical History   Procedure Laterality Date   • Hand surgery         CURRENT MEDICATIONS  No current facility-administered medications for this encounter.     Current Outpatient Prescriptions   Medication Sig Dispense Refill   • ziprasidone (GEODON) 40 MG Cap Take 40 mg by mouth 2 Times a Day.     • methadone (DOLOPHINE) 40 MG TABLET SOLUBLE Take 40 mg by mouth.     • topiramate (TOPAMAX) 100 MG Tab  "Take 1 Tab by mouth 2 times a day. 60 Tab 0       ALLERGIES  Allergies   Allergen Reactions   • Sulfa Drugs Anaphylaxis and Hives       PHYSICAL EXAM  VITAL SIGNS: /57 mmHg  Pulse 70  Temp(Src) 37.1 °C (98.7 °F)  Resp 18  Ht 1.651 m (5' 5\")  Wt 70.308 kg (155 lb)  BMI 25.79 kg/m2  Reviewed and normal  Constitutional: Well developed, Well nourished.  HENT: Normocephalic, atraumatic, bilateral external ears normal, oropharynx moist, No exudates or erythema. No tongue lacerations.   Eyes: PERRLA, conjunctiva pink, no scleral icterus. Pupils are 2.5 mm and reactive.   Cardiovascular: Regular rate and rhythm. No murmurs, rubs or gallops.   Respiratory: Lungs clear to auscultation bilaterally. No wheezes, rales, or rhonchi.   Gastrointestinal: Abdomen soft, non-tender, non distended.   Skin: No erythema, no rash.   Genitourinary: No costovertebral angle tenderness.   Neurologic: Alert & oriented x 3, cranial nerves 2-12 intact , grasp, biceps, extensor hallucis longus symmetric.  No focal deficit noted.  Psychiatric: Affect normal.    DIFFERENTIAL DIAGNOSIS:  Breakthrough seizure, sleep deprivation, hypoglycemia, doubt hyponatremia, doubt alcohol or drug use.    EKG  Interpreted by me. Indication nursing protocol.    Rhythm:  Normal sinus rhythm   Rate: 61  Axis: normal  Ectopy: none  Conduction: normal  ST Segments: Non specific inferior ST change.   T Waves: no acute change  Q Waves: none  Clinical Impression: Normal EKG with non specific ST change.       RADIOLOGY/PROCEDURES  Per chart review patient had recent unremarkable head and cervical spine CT after presenting for seizure in May.    LABORATORY:  Results for orders placed or performed during the hospital encounter of 06/21/17   ACCU-CHEK GLUCOSE   Result Value Ref Range    Glucose - Accu-Ck 71 65 - 99 mg/dL        INTERVENTIONS:  Medications   lorazepam (ATIVAN) injection 1 mg (1 mg Intravenous Given 6/21/17 1412)     Response: Somnolence and no " recurrent seizures.     COURSE & MEDICAL DECISION MAKING    2:01 PM - Patient seen and examined at bedside. Patient will be treated with 1 mg Ativan for his symptoms. Ordered Accu-chek glucose to evaluate.     2:07 PM Per nursing, patient's blood glucose is 71 at bedside.     Well-appearing patient presents with a breakthrough seizure. He reports compliance with his antiepileptics. There is no evidence of hypoglycemia. He does not have risk factors for hyponatremia. There is no recent trauma. No evidence of status epilepticus.    PLAN:  Continue current antiepileptics  Seizure handout given    Follow-up your neurologist as needed for continued seizure    CONDITION: Stable.    FINAL IMPRESSION  1. Breakthrough seizure (CMS-[facility])          Electronically signed by: Vania Gilbert, 6/21/2017 2:01 PM    The note accurately reflects work and decisions made by me.  Juan David  6/21/2017  3:35 PM

## 2017-06-21 NOTE — ED AVS SNAPSHOT
Home Care Instructions                                                                                                                Mahamed Alistair Mae   MRN: [id number]    Department:  [facility], Emergency Dept   Date of Visit:  6/21/2017            [facility], Emergency Dept    [address]    [place] [address]    Phone:  [phone]      You were seen by     Juan David M.D.      Your Diagnosis Was     Breakthrough seizure (CMS-HCC)     G40.919       These are the medications you received during your hospitalization from 06/21/2017 1351 to 06/21/2017 1619     Date/Time Order Dose Route Action    06/21/2017 1412 lorazepam (ATIVAN) injection 1 mg 1 mg Intravenous Given      Follow-up Information     1. Schedule an appointment as soon as possible for a visit with Your Physician.    Specialty:  Emergency Medicine    Why:  As needed    Contact information    Varies        Medication Information     Review all of your home medications and newly ordered medications with your primary doctor and/or pharmacist as soon as possible. Follow medication instructions as directed by your doctor and/or pharmacist.     Please keep your complete medication list with you and share with your physician. Update the information when medications are discontinued, doses are changed, or new medications (including over-the-counter products) are added; and carry medication information at all times in the event of emergency situations.               Medication List      ASK your doctor about these medications        Instructions    Morning Afternoon Evening Bedtime    hydrOXYzine 50 MG Tabs   Commonly known as:  ATARAX        Take 50 mg by mouth 2 Times a Day.   Dose:  50 mg                        levetiracetam 500 MG Tabs   Commonly known as:  KEPPRA        Take 500 mg by mouth 2 times a day.   Dose:  500 mg                        lithium  MG Tbcr   Commonly known as:  ESKALITH CR        Take 500 mg by mouth 3 times a day.   Dose:  500 mg                        methadone 40 MG Tbso   Commonly known as:  DOLOPHINE        Take 45 mg by mouth every day.   Dose:  45 mg                        topiramate 100 MG Tabs   Commonly known as:  TOPAMAX        Take 1 Tab by mouth 2 times a day.   Dose:  100 mg                                Procedures and tests performed during your visit     ACCU-CHEK GLUCOSE    NURSING COMMUNICATION        Discharge Instructions       Seizures     Return for back to back seizures or seizure longer than 10 minutes.  Return for ill appearance, fever or injury.  Followup with neurology.  Do not drive for 3 months until cleared by neurology.  During a seizure ease the patient to the floor, move objects away from him to prevent injury, put nothing in his mouth. Continue your medications as prescribed..     You had a seizure.  About 2% of the population will have a seizure problem during their life.  Sometimes the cause for the seizure is not known.  Seizures are most often associated with one of these problems:  Ø Epilepsy.  Ø Not taking your seizure medicine.  Ø Alcohol and drug abuse.  Ø Head injury, strokes, tumors, and brain surgery.  Ø High fever and infections.  Ø Low blood sugar.     Evaluating a new seizure disorder may require having a brain scan or an EEG (brain wave test). If you have been given a seizure medicine, it is very important that you take it as prescribed.  Not taking these medicines as directed is the most common cause of seizures. Blood tests are often used to be sure you are taking the proper dose.      Seizures cause many different symptoms, from convulsions to brief blackouts. Please do not ride a bike, drive a car, go swimming, climb in high or dangerous places such as ladders or roofs, or operate any dangerous equipment until you have your doctor's permission.  If you hold a 's license, state law may require that a report be made to the motor  vehicles department.  You should wear an emergency medical identification bracelet with information about your seizures. If you have any warning that a seizure may occur, lie down in a safe place to protect yourself.     Teach your family and friends what to do if you have any further seizures. They should stay calm and try to keep you from falling on hard or sharp objects. It is best not to try to restrain a seizing person or to force anything into their mouth. Do not try to open clenched jaws.  When the seizure is over, the person should be rolled on their side to help drain any vomit or secretions from the mouth.  After a seizure the person may be confused or drowsy for several minutes and they can be reassured. An ambulance should be called if the seizure lasted more than 5 minutes or if confusion remains for more than 30 minutes.  Call your caregiver or the emergency department for further instructions.     Do not drive until cleared by your caregiver or neurologist!     Document Released: 01/25/2006  Document Re-Released: 10/14/2008  BuildMyMove® Patient Information ©2009 [employer].            Patient Information     Patient Information    Following emergency treatment: all patient requiring follow-up care must return either to a private physician or a clinic if your condition worsens before you are able to obtain further medical attention, please return to the emergency room.     Billing Information    At [facility], we work to make the billing process streamlined for our patients.  Our Representatives are here to answer any questions you may have regarding your hospital bill.  If you have insurance coverage and have supplied your insurance information to us, we will submit a claim to your insurer on your behalf.  Should you have any questions regarding your bill, we can be reached online or by phone as follows:  Online: You are able pay your bills online or live chat with our representatives about any  billing questions you may have. We are here to help Monday - Friday from 8:00am to 7:30pm and 9:00am - 12:00pm on Saturdays.  Please visit [email or web address]  for more information.   Phone:  [phone] or [phone]    Please note that your emergency physician, surgeon, pathologist, radiologist, anesthesiologist, and other specialists are not employed by [facility] and will therefore bill separately for their services.  Please contact them directly for any questions concerning their bills at the numbers below:     Emergency Physician Services:  [phone]  [facility]:  [phone]  [facility]:  [phone]  [facility]:  [phone]    1. Your final bill may vary from the amount quoted upon discharge if all procedures are not complete at that time, or if your doctor has additional procedures of which we are not aware. You will receive an additional bill if you return to the Emergency Department at [facility] for suture removal regardless of the facility of which the sutures were placed.     2. Please arrange for settlement of this account at the emergency registration.    3. All self-pay accounts are due in full at the time of treatment.  If you are unable to meet this obligation then payment is expected within 4-5 days.     4. If you have had radiology studies (CT, X-ray, Ultrasound, MRI), you have received a preliminary result during your emergency department visit. Please contact the radiology department [phone] to receive a copy of your final result. Please discuss the Final result with your primary physician or with the follow up physician provided.     Crisis Hotline:  [place] Crisis Hotline:  8-736-GOUENTK or 1-156.450.3714  [place] Crisis Hotline:    1-238.200.5528 or 025-307-6728         ED Discharge Follow Up Questions    1. In order to provide you with very good care, we would like to follow up with a  phone call in the next few days.  May we have your permission to contact you?     YES /  NO    2. What is the best phone number to call you? (       )_____-__________    3. What is the best time to call you?      Morning  /  Afternoon  /  Evening                   Patient Signature:  ____________________________________________________________    Date:  ____________________________________________________________

## 2017-06-21 NOTE — ED NOTES
Waiting for pt to be more alert and oriented before walking. Pt remains seizure free at this time.

## 2017-06-21 NOTE — DISCHARGE INSTRUCTIONS
Seizures     Return for back to back seizures or seizure longer than 10 minutes.  Return for ill appearance, fever or injury.  Followup with neurology.  Do not drive for 3 months until cleared by neurology.  During a seizure ease the patient to the floor, move objects away from him to prevent injury, put nothing in his mouth. Continue your medications as prescribed..     You had a seizure.  About 2% of the population will have a seizure problem during their life.  Sometimes the cause for the seizure is not known.  Seizures are most often associated with one of these problems:  Ø Epilepsy.  Ø Not taking your seizure medicine.  Ø Alcohol and drug abuse.  Ø Head injury, strokes, tumors, and brain surgery.  Ø High fever and infections.  Ø Low blood sugar.     Evaluating a new seizure disorder may require having a brain scan or an EEG (brain wave test). If you have been given a seizure medicine, it is very important that you take it as prescribed.  Not taking these medicines as directed is the most common cause of seizures. Blood tests are often used to be sure you are taking the proper dose.      Seizures cause many different symptoms, from convulsions to brief blackouts. Please do not ride a bike, drive a car, go swimming, climb in high or dangerous places such as ladders or roofs, or operate any dangerous equipment until you have your doctor's permission.  If you hold a 's license, state law may require that a report be made to the motor vehicles department.  You should wear an emergency medical identification bracelet with information about your seizures. If you have any warning that a seizure may occur, lie down in a safe place to protect yourself.     Teach your family and friends what to do if you have any further seizures. They should stay calm and try to keep you from falling on hard or sharp objects. It is best not to try to restrain a seizing person or to force anything into their mouth. Do not try to  open clenched jaws.  When the seizure is over, the person should be rolled on their side to help drain any vomit or secretions from the mouth.  After a seizure the person may be confused or drowsy for several minutes and they can be reassured. An ambulance should be called if the seizure lasted more than 5 minutes or if confusion remains for more than 30 minutes.  Call your caregiver or the emergency department for further instructions.     Do not drive until cleared by your caregiver or neurologist!     Document Released: 01/25/2006  Document Re-Released: 10/14/2008  bCODE® Patient Information ©2009 [employer], [employer].

## 2017-06-21 NOTE — ED NOTES
PT BIB [facility] for Sz. Was missing for 15 mins when co workers found pt on ground of bathroom in active seizure. When [facility] arrived he was still seizing, airway open and clear. Pt postictal on arrival. Hx of seizures, states he is compliant with sz meds. VSS, Seizure precautions in place. On monitor EKG done now. PIV placed pta. Chart up for ERP. Pt is A&Ox4

## 2017-06-21 NOTE — ED AVS SNAPSHOT
6/21/2017    Mahamed Alistair Mae  [address] [place] [address]    Dear Mahamed:    [facility] wants to ensure your discharge home is safe and you or your loved ones have had all of your questions answered regarding your care after you leave the hospital.    Below is a list of resources and contact information should you have any questions regarding your hospital stay, follow-up instructions, or active medical symptoms.    Questions or Concerns Regarding… Contact   Medical Questions Related to Your Discharge  (7 days a week, 8am-5pm) Contact a Nurse Care Coordinator   [phone]   Medical Questions Not Related to Your Discharge  (24 hours a day / 7 days a week)  Contact the Nurse Health Line   [phone]    Medications or Discharge Instructions Refer to your discharge packet   or contact your [facility] Primary Care Provider   [phone]   Follow-up Appointment(s) Schedule your appointment via Bubbli   or contact Scheduling [phone]   Billing Review your statement via Bubbli  or contact Billing [phone]   Medical Records Review your records via Bubbli   or contact Medical Records [phone]     You may receive a telephone call within two days of discharge. This call is to make certain you understand your discharge instructions and have the opportunity to have any questions answered. You can also easily access your medical information, test results and upcoming appointments via the Bubbli free online health management tool. You can learn more and sign up at [employer]/Bubbli. For assistance setting up your Bubbli account, please call [phone].    Once again, we want to ensure your discharge home is safe and that you have a clear understanding of any next steps in your care. If you have any questions or concerns, please do not hesitate to contact us, we are here for you. Thank you for choosing [facility] for your healthcare needs.    Sincerely,    Your [facility] Healthcare Team

## 2017-06-21 NOTE — ED NOTES
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 Wife states she is at work and on her way from work

## 2017-06-22 LAB — EKG IMPRESSION: NORMAL

## 2017-06-26 ENCOUNTER — NON-PROVIDER VISIT (OUTPATIENT)
Dept: URGENT CARE | Facility: PHYSICIAN GROUP | Age: 32
End: 2017-06-26

## 2017-06-26 DIAGNOSIS — Z02.1 PRE-EMPLOYMENT DRUG SCREENING: ICD-10-CM

## 2017-06-26 LAB
AMP AMPHETAMINE: NORMAL
BAR BARBITURATES: NORMAL
BZO BENZODIAZEPINES: NORMAL
COC COCAINE: NORMAL
INT CON NEG: NEGATIVE
INT CON POS: POSITIVE
MDMA ECSTASY: NORMAL
MET METHAMPHETAMINES: NORMAL
MTD METHADONE: NORMAL
OPI OPIATES: NORMAL
OXY OXYCODONE: NORMAL
PCP PHENCYCLIDINE: NORMAL
POC URINE DRUG SCREEN OCDRS: NORMAL
THC: NORMAL

## 2017-06-26 PROCEDURE — 80305 DRUG TEST PRSMV DIR OPT OBS: CPT | Performed by: EMERGENCY MEDICINE

## 2017-07-25 ENCOUNTER — APPOINTMENT (OUTPATIENT)
Dept: RADIOLOGY | Facility: MEDICAL CENTER | Age: 32
End: 2017-07-25
Attending: EMERGENCY MEDICINE
Payer: MEDICAID

## 2017-07-25 ENCOUNTER — HOSPITAL ENCOUNTER (EMERGENCY)
Facility: MEDICAL CENTER | Age: 32
End: 2017-07-25
Attending: EMERGENCY MEDICINE
Payer: MEDICAID

## 2017-07-25 VITALS
WEIGHT: 155 LBS | RESPIRATION RATE: 14 BRPM | OXYGEN SATURATION: 95 % | HEART RATE: 81 BPM | HEIGHT: 65 IN | TEMPERATURE: 98.2 F | BODY MASS INDEX: 25.83 KG/M2

## 2017-07-25 DIAGNOSIS — R56.9 SEIZURE (HCC): ICD-10-CM

## 2017-07-25 DIAGNOSIS — S00.83XA CONTUSION OF JAW, INITIAL ENCOUNTER: ICD-10-CM

## 2017-07-25 PROCEDURE — 70450 CT HEAD/BRAIN W/O DYE: CPT

## 2017-07-25 PROCEDURE — 70486 CT MAXILLOFACIAL W/O DYE: CPT

## 2017-07-25 PROCEDURE — 99283 EMERGENCY DEPT VISIT LOW MDM: CPT

## 2017-07-25 RX ORDER — KETOROLAC TROMETHAMINE 30 MG/ML
30 INJECTION, SOLUTION INTRAMUSCULAR; INTRAVENOUS ONCE
Status: DISCONTINUED | OUTPATIENT
Start: 2017-07-25 | End: 2017-07-25 | Stop reason: HOSPADM

## 2017-07-25 ASSESSMENT — PAIN SCALES - GENERAL: PAINLEVEL_OUTOF10: 5

## 2017-07-25 ASSESSMENT — LIFESTYLE VARIABLES: DO YOU DRINK ALCOHOL: NO

## 2017-07-25 NOTE — ED AVS SNAPSHOT
UPlanMe Access Code: [email or web address]  Expires: 8/24/2017 12:05 PM    UPlanMe  A secure, online tool to manage your health information     [employer]’s UPlanMe® is a secure, online tool that connects you to your personalized health information from the privacy of your home -- day or night - making it very easy for you to manage your healthcare. Once the activation process is completed, you can even access your medical information using the UPlanMe benjamin, which is available for free in the Apple Benjamin store or Google Play store.     UPlanMe provides the following levels of access (as shown below):   My Chart Features   [facility] Primary Care Doctor [facility]  Specialists [facility]  Urgent  Care Non-[facility]  Primary Care  Doctor   Email your healthcare team securely and privately 24/7 X X X X   Manage appointments: schedule your next appointment; view details of past/upcoming appointments X      Request prescription refills. X      View recent personal medical records, including lab and immunizations X X X X   View health record, including health history, allergies, medications X X X X   Read reports about your outpatient visits, procedures, consult and ER notes X X X X   See your discharge summary, which is a recap of your hospital and/or ER visit that includes your diagnosis, lab results, and care plan. X X       How to register for UPlanMe:  1. Go to  [email or web address].  2. Click on the Sign Up Now box, which takes you to the New Member Sign Up page. You will need to provide the following information:  a. Enter your UPlanMe Access Code exactly as it appears at the top of this page. (You will not need to use this code after you’ve completed the sign-up process. If you do not sign up before the expiration date, you must request a new code.)   b. Enter your date of birth.   c. Enter your home email address.   d. Click Submit, and follow the next screen’s instructions.  3. Create a UPlanMe ID. This will be your UPlanMe  login ID and cannot be changed, so think of one that is secure and easy to remember.  4. Create a byUs password. You can change your password at any time.  5. Enter your Password Reset Question and Answer. This can be used at a later time if you forget your password.   6. Enter your e-mail address. This allows you to receive e-mail notifications when new information is available in byUs.  7. Click Sign Up. You can now view your health information.    For assistance activating your byUs account, call [phone]

## 2017-07-25 NOTE — ED NOTES
patient has HX of seizure. Was changed from depakote and topamax to Keppra. Is also on seroquel and lithium...  Pt had a seizure today and fell face forward and is complaining of facial pain    Given intranasal versed 2MG by DASHA

## 2017-07-25 NOTE — ED AVS SNAPSHOT
Home Care Instructions                                                                                                                Mahamed Mae   MRN: [id number]    Department:  [facility], Emergency Dept   Date of Visit:  7/25/2017            [facility], Emergency Dept    [address]    [facility] [address]    Phone:  [phone]      You were seen by     Cris Bajwa M.D.      Your Diagnosis Was     Seizure (CMS-HCC)     R56.9       These are the medications you received during your hospitalization from 07/25/2017 0942 to 07/25/2017 1205     Date/Time Order Dose Route Action    07/25/2017 1100 ketorolac (TORADOL) injection 30 mg 30 mg Intravenous Refused      Follow-up Information     1. Follow up with ZONIA Fregoso. Call today.    Specialty:  Neurology    Why:  for re-check later this week    Contact information    [address]  [facility] [address]  [facility] [address] [phone]        Medication Information     Review all of your home medications and newly ordered medications with your primary doctor and/or pharmacist as soon as possible. Follow medication instructions as directed by your doctor and/or pharmacist.     Please keep your complete medication list with you and share with your physician. Update the information when medications are discontinued, doses are changed, or new medications (including over-the-counter products) are added; and carry medication information at all times in the event of emergency situations.               Medication List      ASK your doctor about these medications        Instructions    Morning Afternoon Evening Bedtime    hydrOXYzine 50 MG Tabs   Commonly known as:  ATARAX        Take 50 mg by mouth 2 Times a Day.   Dose:  50 mg                        levetiracetam 500 MG Tabs   Commonly known as:  KEPPRA        Take 500 mg by mouth 2 times a day.   Dose:  500 mg                        lithium  MG Tbcr   Commonly  known as:  ESKALITH CR        Take 500 mg by mouth 3 times a day.   Dose:  500 mg                        methadone 40 MG Tbso   Commonly known as:  DOLOPHINE        Take 45 mg by mouth every day.   Dose:  45 mg                        topiramate 100 MG Tabs   Commonly known as:  TOPAMAX        Take 1 Tab by mouth 2 times a day.   Dose:  100 mg                                Procedures and tests performed during your visit     CT-HEAD W/O    CT-MAXILLOFACIAL W/O PLUS RECONS        Discharge Instructions       Cryotherapy  Cryotherapy is when you put ice on your injury. Ice helps lessen pain and puffiness (swelling) after an injury. Ice works the best when you start using it in the first 24 to 48 hours after an injury.  HOME CARE  · Put a dry or damp towel between the ice pack and your skin.  · You may press gently on the ice pack.  · Leave the ice on for no more than 10 to 20 minutes at a time.  · Check your skin after 5 minutes to make sure your skin is okay.  · Rest at least 20 minutes between ice pack uses.  · Stop using ice when your skin loses feeling (numbness).  · Do not use ice on someone who cannot tell you when it hurts. This includes small children and people with memory problems (dementia).  GET HELP RIGHT AWAY IF:  · You have white spots on your skin.  · Your skin turns blue or pale.  · Your skin feels waxy or hard.  · Your puffiness gets worse.  MAKE SURE YOU:   · Understand these instructions.  · Will watch your condition.  · Will get help right away if you are not doing well or get worse.     This information is not intended to replace advice given to you by your health care provider. Make sure you discuss any questions you have with your health care provider.     Document Released: 06/05/2009 Document Revised: 03/11/2013 Document Reviewed: 08/09/2012  [employer] Interactive Patient Education ©2016 [employer]    Epilepsy  People with epilepsy have times when they shake and jerk uncontrollably  (seizures). This happens when there is a sudden change in brain function. Epilepsy may have many possible causes. Anything that disturbs the normal pattern of brain cell activity can lead to seizures.  HOME CARE   · Follow your doctor's instructions about driving and safety during normal activities.  · Get enough sleep.  · Only take medicine as told by your doctor.  · Avoid things that you know can cause you to have seizures (triggers).  · Write down when your seizures happen and what you remember about each seizure. Write down anything you think may have caused the seizure to happen.  · Tell the people you live and work with that you have seizures. Make sure they know how to help you. They should:  ¨ Cushion your head and body.  ¨ Turn you on your side.  ¨ Not restrain you.  ¨ Not place anything inside your mouth.  ¨ Call for local emergency medical help if there is any question about what has happened.  · Keep all follow-up visits with your doctor. This is very important.  GET HELP IF:  · You get an infection or start to feel sick. You may have more seizures when you are sick.  · You are having seizures more often.  · Your seizure pattern is changing.  GET HELP RIGHT AWAY IF:   · A seizure does not stop after a few seconds or minutes.  · A seizure causes you to have trouble breathing.  · A seizure gives you a very bad headache.  · A seizure makes you unable to speak or use a part of your body.     This information is not intended to replace advice given to you by your health care provider. Make sure you discuss any questions you have with your health care provider.     Document Released: 10/15/2010 Document Revised: 10/08/2014 Document Reviewed: 07/30/2014  [employer] Interactive Patient Education ©2016 [employer] Inc.            Patient Information     Patient Information    Following emergency treatment: all patient requiring follow-up care must return either to a private physician or a clinic if your condition  worsens before you are able to obtain further medical attention, please return to the emergency room.     Billing Information    At [facility], we work to make the billing process streamlined for our patients.  Our Representatives are here to answer any questions you may have regarding your hospital bill.  If you have insurance coverage and have supplied your insurance information to us, we will submit a claim to your insurer on your behalf.  Should you have any questions regarding your bill, we can be reached online or by phone as follows:  Online: You are able pay your bills online or live chat with our representatives about any billing questions you may have. We are here to help Monday - Friday from 8:00am to 7:30pm and 9:00am - 12:00pm on Saturdays.  Please visit [email or web address]  for more information.   Phone:  [phone] or [phone]    Please note that your emergency physician, surgeon, pathologist, radiologist, anesthesiologist, and other specialists are not employed by [facility] and will therefore bill separately for their services.  Please contact them directly for any questions concerning their bills at the numbers below:     Emergency Physician Services:  [phone]  [facility]:  [phone]  [facility]:  [phone]  [facility]:  [phone]    1. Your final bill may vary from the amount quoted upon discharge if all procedures are not complete at that time, or if your doctor has additional procedures of which we are not aware. You will receive an additional bill if you return to the Emergency Department at [facility] for suture removal regardless of the facility of which the sutures were placed.     2. Please arrange for settlement of this account at the emergency registration.    3. All self-pay accounts are due in full at the time of treatment.  If you are unable to meet this obligation then payment  is expected within 4-5 days.     4. If you have had radiology studies (CT, X-ray, Ultrasound, MRI), you have received a preliminary result during your emergency department visit. Please contact the radiology department [phone] to receive a copy of your final result. Please discuss the Final result with your primary physician or with the follow up physician provided.     Crisis Hotline:  [place] Crisis Hotline:  1-880-CAUPHFI or 1-955.766.4401  [place] Crisis Hotline:    1-697.257.9018 or 650-872-8791         ED Discharge Follow Up Questions    1. In order to provide you with very good care, we would like to follow up with a phone call in the next few days.  May we have your permission to contact you?     YES /  NO    2. What is the best phone number to call you? (       )_____-__________    3. What is the best time to call you?      Morning  /  Afternoon  /  Evening                   Patient Signature:  ____________________________________________________________    Date:  ____________________________________________________________
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
## 2017-07-25 NOTE — DISCHARGE INSTRUCTIONS
Cryotherapy  Cryotherapy is when you put ice on your injury. Ice helps lessen pain and puffiness (swelling) after an injury. Ice works the best when you start using it in the first 24 to 48 hours after an injury.  HOME CARE  · Put a dry or damp towel between the ice pack and your skin.  · You may press gently on the ice pack.  · Leave the ice on for no more than 10 to 20 minutes at a time.  · Check your skin after 5 minutes to make sure your skin is okay.  · Rest at least 20 minutes between ice pack uses.  · Stop using ice when your skin loses feeling (numbness).  · Do not use ice on someone who cannot tell you when it hurts. This includes small children and people with memory problems (dementia).  GET HELP RIGHT AWAY IF:  · You have white spots on your skin.  · Your skin turns blue or pale.  · Your skin feels waxy or hard.  · Your puffiness gets worse.  MAKE SURE YOU:   · Understand these instructions.  · Will watch your condition.  · Will get help right away if you are not doing well or get worse.     This information is not intended to replace advice given to you by your health care provider. Make sure you discuss any questions you have with your health care provider.     Document Released: 06/05/2009 Document Revised: 03/11/2013 Document Reviewed: 08/09/2012  [employer] Interactive Patient Education ©2016 [employer]    Epilepsy  People with epilepsy have times when they shake and jerk uncontrollably (seizures). This happens when there is a sudden change in brain function. Epilepsy may have many possible causes. Anything that disturbs the normal pattern of brain cell activity can lead to seizures.  HOME CARE   · Follow your doctor's instructions about driving and safety during normal activities.  · Get enough sleep.  · Only take medicine as told by your doctor.  · Avoid things that you know can cause you to have seizures (triggers).  · Write down when your seizures happen and what you remember about each  seizure. Write down anything you think may have caused the seizure to happen.  · Tell the people you live and work with that you have seizures. Make sure they know how to help you. They should:  ¨ Cushion your head and body.  ¨ Turn you on your side.  ¨ Not restrain you.  ¨ Not place anything inside your mouth.  ¨ Call for local emergency medical help if there is any question about what has happened.  · Keep all follow-up visits with your doctor. This is very important.  GET HELP IF:  · You get an infection or start to feel sick. You may have more seizures when you are sick.  · You are having seizures more often.  · Your seizure pattern is changing.  GET HELP RIGHT AWAY IF:   · A seizure does not stop after a few seconds or minutes.  · A seizure causes you to have trouble breathing.  · A seizure gives you a very bad headache.  · A seizure makes you unable to speak or use a part of your body.     This information is not intended to replace advice given to you by your health care provider. Make sure you discuss any questions you have with your health care provider.     Document Released: 10/15/2010 Document Revised: 10/08/2014 Document Reviewed: 07/30/2014  [employer] Interactive Patient Education ©2016 [employer] Inc.

## 2017-07-25 NOTE — ED AVS SNAPSHOT
7/25/2017    Mahamed Alistair Mae  [address] [place] [address]    Dear Mahamed:    [facility] wants to ensure your discharge home is safe and you or your loved ones have had all of your questions answered regarding your care after you leave the hospital.    Below is a list of resources and contact information should you have any questions regarding your hospital stay, follow-up instructions, or active medical symptoms.    Questions or Concerns Regarding… Contact   Medical Questions Related to Your Discharge  (7 days a week, 8am-5pm) Contact a Nurse Care Coordinator   [phone]   Medical Questions Not Related to Your Discharge  (24 hours a day / 7 days a week)  Contact the Nurse Health Line   [phone]    Medications or Discharge Instructions Refer to your discharge packet   or contact your [facility] Primary Care Provider   [phone]   Follow-up Appointment(s) Schedule your appointment via [employer]   or contact Scheduling [phone]   Billing Review your statement via [employer]  or contact Billing [phone]   Medical Records Review your records via [employer]   or contact Medical Records [phone]     You may receive a telephone call within two days of discharge. This call is to make certain you understand your discharge instructions and have the opportunity to have any questions answered. You can also easily access your medical information, test results and upcoming appointments via the [employer] free online health management tool. You can learn more and sign up at [employer]. For assistance setting up your [employer] account, please call [phone].    Once again, we want to ensure your discharge home is safe and that you have a clear understanding of any next steps in your care. If you have any questions or concerns, please do not hesitate to contact us, we are here for you. Thank you for choosing [facility] for your healthcare needs.    Sincerely,    Your [facility] Healthcare Team

## 2017-09-20 ENCOUNTER — HOSPITAL ENCOUNTER (EMERGENCY)
Facility: MEDICAL CENTER | Age: 32
End: 2017-09-20
Attending: EMERGENCY MEDICINE
Payer: MEDICAID

## 2017-09-20 VITALS
TEMPERATURE: 97.9 F | DIASTOLIC BLOOD PRESSURE: 70 MMHG | HEIGHT: 65 IN | HEART RATE: 114 BPM | OXYGEN SATURATION: 97 % | RESPIRATION RATE: 15 BRPM | SYSTOLIC BLOOD PRESSURE: 122 MMHG | WEIGHT: 160 LBS | BODY MASS INDEX: 26.66 KG/M2

## 2017-09-20 DIAGNOSIS — R56.9 SEIZURE (HCC): ICD-10-CM

## 2017-09-20 LAB
ALBUMIN SERPL BCP-MCNC: 4.1 G/DL (ref 3.2–4.9)
ALBUMIN/GLOB SERPL: 1.2 G/DL
ALP SERPL-CCNC: 89 U/L (ref 30–99)
ALT SERPL-CCNC: 86 U/L (ref 2–50)
AMPHET UR QL SCN: NEGATIVE
ANION GAP SERPL CALC-SCNC: 7 MMOL/L (ref 0–11.9)
APPEARANCE UR: CLEAR
AST SERPL-CCNC: 46 U/L (ref 12–45)
BARBITURATES UR QL SCN: NEGATIVE
BASOPHILS # BLD AUTO: 0.4 % (ref 0–1.8)
BASOPHILS # BLD: 0.05 K/UL (ref 0–0.12)
BENZODIAZ UR QL SCN: POSITIVE
BILIRUB SERPL-MCNC: 0.8 MG/DL (ref 0.1–1.5)
BILIRUB UR QL STRIP.AUTO: NEGATIVE
BUN SERPL-MCNC: 7 MG/DL (ref 8–22)
BZE UR QL SCN: NEGATIVE
CALCIUM SERPL-MCNC: 9.3 MG/DL (ref 8.5–10.5)
CANNABINOIDS UR QL SCN: NEGATIVE
CHLORIDE SERPL-SCNC: 104 MMOL/L (ref 96–112)
CO2 SERPL-SCNC: 24 MMOL/L (ref 20–33)
COLOR UR: YELLOW
CREAT SERPL-MCNC: 0.87 MG/DL (ref 0.5–1.4)
CULTURE IF INDICATED INDCX: NO UA CULTURE
EOSINOPHIL # BLD AUTO: 0.05 K/UL (ref 0–0.51)
EOSINOPHIL NFR BLD: 0.4 % (ref 0–6.9)
ERYTHROCYTE [DISTWIDTH] IN BLOOD BY AUTOMATED COUNT: 42.6 FL (ref 35.9–50)
GFR SERPL CREATININE-BSD FRML MDRD: >60 ML/MIN/1.73 M 2
GLOBULIN SER CALC-MCNC: 3.4 G/DL (ref 1.9–3.5)
GLUCOSE SERPL-MCNC: 99 MG/DL (ref 65–99)
GLUCOSE UR STRIP.AUTO-MCNC: NEGATIVE MG/DL
HCT VFR BLD AUTO: 45.8 % (ref 42–52)
HGB BLD-MCNC: 15.7 G/DL (ref 14–18)
IMM GRANULOCYTES # BLD AUTO: 0.06 K/UL (ref 0–0.11)
IMM GRANULOCYTES NFR BLD AUTO: 0.4 % (ref 0–0.9)
KETONES UR STRIP.AUTO-MCNC: NEGATIVE MG/DL
LEUKOCYTE ESTERASE UR QL STRIP.AUTO: NEGATIVE
LYMPHOCYTES # BLD AUTO: 3.24 K/UL (ref 1–4.8)
LYMPHOCYTES NFR BLD: 23.4 % (ref 22–41)
MCH RBC QN AUTO: 30.5 PG (ref 27–33)
MCHC RBC AUTO-ENTMCNC: 34.3 G/DL (ref 33.7–35.3)
MCV RBC AUTO: 88.9 FL (ref 81.4–97.8)
METHADONE UR QL SCN: NEGATIVE
MICRO URNS: NORMAL
MONOCYTES # BLD AUTO: 1.31 K/UL (ref 0–0.85)
MONOCYTES NFR BLD AUTO: 9.5 % (ref 0–13.4)
NEUTROPHILS # BLD AUTO: 9.14 K/UL (ref 1.82–7.42)
NEUTROPHILS NFR BLD: 65.9 % (ref 44–72)
NITRITE UR QL STRIP.AUTO: NEGATIVE
NRBC # BLD AUTO: 0 K/UL
NRBC BLD AUTO-RTO: 0 /100 WBC
OPIATES UR QL SCN: POSITIVE
OXYCODONE UR QL SCN: NEGATIVE
PCP UR QL SCN: NEGATIVE
PH UR STRIP.AUTO: 5 [PH]
PLATELET # BLD AUTO: 246 K/UL (ref 164–446)
PMV BLD AUTO: 10 FL (ref 9–12.9)
POTASSIUM SERPL-SCNC: 3.6 MMOL/L (ref 3.6–5.5)
PROPOXYPH UR QL SCN: NEGATIVE
PROT SERPL-MCNC: 7.5 G/DL (ref 6–8.2)
PROT UR QL STRIP: NEGATIVE MG/DL
RBC # BLD AUTO: 5.15 M/UL (ref 4.7–6.1)
RBC UR QL AUTO: NEGATIVE
SODIUM SERPL-SCNC: 135 MMOL/L (ref 135–145)
SP GR UR STRIP.AUTO: 1.02
UROBILINOGEN UR STRIP.AUTO-MCNC: 0.2 MG/DL
WBC # BLD AUTO: 13.9 K/UL (ref 4.8–10.8)

## 2017-09-20 PROCEDURE — A9270 NON-COVERED ITEM OR SERVICE: HCPCS | Performed by: EMERGENCY MEDICINE

## 2017-09-20 PROCEDURE — 85025 COMPLETE CBC W/AUTO DIFF WBC: CPT

## 2017-09-20 PROCEDURE — 80053 COMPREHEN METABOLIC PANEL: CPT

## 2017-09-20 PROCEDURE — 81003 URINALYSIS AUTO W/O SCOPE: CPT

## 2017-09-20 PROCEDURE — 80307 DRUG TEST PRSMV CHEM ANLYZR: CPT

## 2017-09-20 PROCEDURE — 36415 COLL VENOUS BLD VENIPUNCTURE: CPT

## 2017-09-20 PROCEDURE — 700102 HCHG RX REV CODE 250 W/ 637 OVERRIDE(OP): Performed by: EMERGENCY MEDICINE

## 2017-09-20 PROCEDURE — 99284 EMERGENCY DEPT VISIT MOD MDM: CPT

## 2017-09-20 RX ORDER — LEVETIRACETAM 500 MG/1
1000 TABLET ORAL ONCE
Status: COMPLETED | OUTPATIENT
Start: 2017-09-20 | End: 2017-09-20

## 2017-09-20 RX ORDER — LEVETIRACETAM 500 MG/1
500 TABLET ORAL 2 TIMES DAILY
Qty: 60 TAB | Refills: 0 | Status: SHIPPED | OUTPATIENT
Start: 2017-09-20 | End: 2017-10-20

## 2017-09-20 RX ADMIN — LEVETIRACETAM 1000 MG: 500 TABLET, FILM COATED ORAL at 21:33

## 2017-09-20 ASSESSMENT — ENCOUNTER SYMPTOMS
ABDOMINAL PAIN: 0
SEIZURES: 1
VOMITING: 0
BACK PAIN: 0
CONFUSION: 0
HEADACHES: 0
COUGH: 0
FEVER: 0
WOUND: 0
CHILLS: 0
NAUSEA: 0
FATIGUE: 0

## 2017-09-20 ASSESSMENT — LIFESTYLE VARIABLES: DO YOU DRINK ALCOHOL: NO

## 2017-09-21 NOTE — DISCHARGE INSTRUCTIONS
Seizure, Adult  A seizure is abnormal electrical activity in the brain. Seizures usually last from 30 seconds to 2 minutes. There are various types of seizures.  Before a seizure, you may have a warning sensation (aura) that a seizure is about to occur. An aura may include the following symptoms:   · Fear or anxiety.  · Nausea.  · Feeling like the room is spinning (vertigo).  · Vision changes, such as seeing flashing lights or spots.  Common symptoms during a seizure include:  · A change in attention or behavior (altered mental status).  · Convulsions with rhythmic jerking movements.  · Drooling.  · Rapid eye movements.  · Grunting.  · Loss of bladder and bowel control.  · Bitter taste in the mouth.  · Tongue biting.  After a seizure, you may feel confused and sleepy. You may also have an injury resulting from convulsions during the seizure.  HOME CARE INSTRUCTIONS   · If you are given medicines, take them exactly as prescribed by your health care provider.  · Keep all follow-up appointments as directed by your health care provider.  · Do not swim or drive or engage in risky activity during which a seizure could cause further injury to you or others until your health care provider says it is OK.  · Get adequate rest.  · Teach friends and family what to do if you have a seizure. They should:  ¨ Lay you on the ground to prevent a fall.  ¨ Put a cushion under your head.  ¨ Loosen any tight clothing around your neck.  ¨ Turn you on your side. If vomiting occurs, this helps keep your airway clear.  ¨ Stay with you until you recover.  ¨ Know whether or not you need emergency care.  SEEK IMMEDIATE MEDICAL CARE IF:  · The seizure lasts longer than 5 minutes.  · The seizure is severe or you do not wake up immediately after the seizure.  · You have an altered mental status after the seizure.  · You are having more frequent or worsening seizures.  Someone should drive you to the emergency department or call local emergency  services (911 in [place]  MAKE SURE YOU:  · Understand these instructions.  · Will watch your condition.  · Will get help right away if you are not doing well or get worse.     This information is not intended to replace advice given to you by your health care provider. Make sure you discuss any questions you have with your health care provider.     Document Released: 12/15/2001 Document Revised: 01/08/2016 Document Reviewed: 07/30/2014  [employer] Interactive Patient Education ©2016 [employer]
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
## 2017-09-21 NOTE — ED NOTES
"Mahamed Sainz earl  32 y.o.  Chief Complaint   Patient presents with   • Seizure     Per report from DASHA, Pt had 3-4 Grand Mal seizures this afternoon after being picked up by DASHA. Pt. states he hasn't taken his Keppra in a few weeks due to inbility to see PCP.     /70   Pulse (!) 102   Temp 36.6 °C (97.9 °F)   Resp 16   Ht 1.651 m (5' 5\")   Wt 72.6 kg (160 lb)   SpO2 96%   BMI 26.63 kg/m²   Pt. Was given 2 of Versed per EMS and has an 18G in his right AC placed PTA.  "

## 2017-09-21 NOTE — ED NOTES
Pt. States he is also Hep. C positive and needs medications for that as well that he has been unable to get due to lack of PCP. Seizure precautions in place. Pt. Informed to notify this RN if he feels stated aura before seizure activity. Call light within reach. Will continue to monitor.

## 2017-09-28 ENCOUNTER — HOSPITAL ENCOUNTER (EMERGENCY)
Facility: MEDICAL CENTER | Age: 32
End: 2017-09-28
Attending: EMERGENCY MEDICINE
Payer: MEDICAID

## 2017-09-28 VITALS
WEIGHT: 161.6 LBS | HEART RATE: 70 BPM | OXYGEN SATURATION: 96 % | RESPIRATION RATE: 16 BRPM | DIASTOLIC BLOOD PRESSURE: 70 MMHG | HEIGHT: 65 IN | TEMPERATURE: 97.6 F | SYSTOLIC BLOOD PRESSURE: 130 MMHG | BODY MASS INDEX: 26.92 KG/M2

## 2017-09-28 DIAGNOSIS — F32.0 MILD SINGLE CURRENT EPISODE OF MAJOR DEPRESSIVE DISORDER (HCC): ICD-10-CM

## 2017-09-28 LAB
AMPHET UR QL SCN: NEGATIVE
BARBITURATES UR QL SCN: NEGATIVE
BENZODIAZ UR QL SCN: NEGATIVE
BZE UR QL SCN: NEGATIVE
CANNABINOIDS UR QL SCN: NEGATIVE
METHADONE UR QL SCN: NEGATIVE
OPIATES UR QL SCN: POSITIVE
OXYCODONE UR QL SCN: NEGATIVE
PCP UR QL SCN: NEGATIVE
POC BREATHALIZER: 0 PERCENT (ref 0–0.01)
PROPOXYPH UR QL SCN: NEGATIVE

## 2017-09-28 PROCEDURE — 302970 POC BREATHALIZER

## 2017-09-28 PROCEDURE — 80307 DRUG TEST PRSMV CHEM ANLYZR: CPT

## 2017-09-28 PROCEDURE — 99285 EMERGENCY DEPT VISIT HI MDM: CPT

## 2017-09-28 PROCEDURE — 302970 POC BREATHALIZER: Performed by: EMERGENCY MEDICINE

## 2017-09-29 NOTE — DISCHARGE INSTRUCTIONS
Mood Disorders  Mood disorders are conditions that affect the way a person feels emotionally. The main mood disorders include:  · Depression.  · Bipolar disorder.  · Dysthymia. Dysthymia is a mild, lasting (chronic) depression. Symptoms of dysthymia are similar to depression, but not as severe.  · Cyclothymia. Cyclothymia includes mood swings, but the highs and lows are not as severe as they are in bipolar disorder. Symptoms of cyclothymia are similar to those of bipolar disorder, but less extreme.  CAUSES   Mood disorders are probably caused by a combination of factors. People with mood disorders seem to have physical and chemical changes in their brains. Mood disorders run in families, so there may be genetic causes. Severe trauma or stressful life events may also increase the risk of mood disorders.   SYMPTOMS   Symptoms of mood disorders depend on the specific type of condition.  Depression symptoms include:  · Feeling sad, worthless, or hopeless.  · Negative thoughts.  · Inability to enjoy one's usual activities.  · Low energy.  · Sleeping too much or too little.  · Appetite changes.  · Crying.  · Concentration problems.  · Thoughts of harming oneself.  Bipolar disorder symptoms include:  · Periods of depression (see above symptoms).  · Mood swings, from sadness and depression, to abnormal elation and excitement.  · Periods of amaury:  · Racing thoughts.  · Fast speech.  · Poor judgment, and careless, dangerous choices.  · Decreased need for sleep.  · Risky behavior.  · Difficulty concentrating.  · Irritability.  · Increased energy.  · Increased sex drive.  DIAGNOSIS   There are no blood tests or X-rays that can confirm a mood disorder. However, your caregiver may choose to run some tests to make sure that there is not another physical cause for your symptoms. A mood disorder is usually diagnosed after an in-depth interview with a caregiver.  TREATMENT   Mood disorders can be treated with one or more of the  following:  · Medicine. This may include antidepressants, mood-stabilizers, or anti-psychotics.  · Psychotherapy (talk therapy).  · Cognitive behavioral therapy. You are taught to recognize negative thoughts and behavior patterns, and replace them with healthy thoughts and behaviors.  · Electroconvulsive therapy. For very severe cases of deep depression, a series of treatments in which an electrical current is applied to the brain.  · Vagus nerve stimulation. A pulse of electricity is applied to a portion of the brain.  · Transcranial magnetic stimulation. Powerful magnets are placed on the head that produce electrical currents.  · Hospitalization. In severe situations, or when someone is having serious thoughts of harming him or herself, hospitalization may be necessary in order to keep the person safe. This is also done to quickly start and monitor treatment.  HOME CARE INSTRUCTIONS   · Take your medicine exactly as directed.  · Attend all of your therapy sessions.  · Try to eat regular, healthy meals.  · Exercise daily. Exercise may improve mood symptoms.  · Get good sleep.  · Do not drink alcohol or use pot or other drugs. These can worsen mood symptoms and cause anxiety and psychosis.  · Tell your caregiver if you develop any side effects, such as feeling sick to your stomach (nauseous), dry mouth, dizziness, constipation, drowsiness, tremor, weight gain, or sexual symptoms. He or she may suggest things you can do to improve symptoms.  · Learn ways to cope with the stress of having a chronic illness. This includes yoga, meditation, robert chi, or participating in a support group.  · Drink enough water to keep your urine clear or pale yellow. Eat a high-fiber diet. These habits may help you avoid constipation from your medicine.  SEEK IMMEDIATE MEDICAL CARE IF:  · Your mood worsens.  · You have thoughts of hurting yourself or others.  · You cannot care for yourself.  · You develop the sensation of hearing or seeing  something that is not actually present (auditory or visual hallucinations).  · You develop abnormal thoughts.  Document Released: 10/15/2010 Document Revised: 03/11/2013 Document Reviewed: 10/15/2010  ExitCare® Patient Information ©2014 [employer].  Ideas de suicidio, [place] ayudarse usted mismo  (Suicidal Feelings, How to Help Yourself)  Todas las personas a veces se sienten tristes o infelices, kami los pensamientos depresivos y sentimientos de desesperanza pueden llevar a ideas de suicidio. Puede parecer que la lisa es demasiado difícil de manejar. Si siente que ha llegado a un punto en el que el suicidio parece ser la única respuesta, es el momento de pedir ayuda inmediatamente.   LEILA HACER FRENTE A LA IDEA DE SUICIDIO Y LEILA PREVENIRLO   · Hable con gonzalez marcello, amigos, maestros o terapeutas. Busque ayuda. Trate de no alejarse de aquellas personas que se preocupan por usted. Aunque no se sienta sociable, hable con alguien todos los días. Lo mejor es hacerlo melly a melly. Recuerde, ellos querrán ayudarle.  · Consuma david dieta a intervalos regulares y diego equilibrada.  · Lien reposo.  · Evite el alcohol y las drogas, ya que sólo le harán sentirse peor y también pueden disminuir tegan inhibiciones. Retírelos de gonzalez casa. Si está pensando en breana david sobredosis de medicamentos recetados, lupe tegan medicinas a alguien que le pueda darle gonzalez dosis diaria. Si está tomando antidepresivos, hable con gonzalez terapeuta acerca de tegan sentimientos, para que pueda administrarle un medicamento más seguro, si esto es un problema.  · Retire las deann o las sustancias venenosas de gonzalez casa.  · Trate de cumplir con tegan rutinas. Siga un programa y recuerde que usted tiene que mantener desmond horario diariamente.  · Establezca algunos objetivos fáciles de cumplir y trate de hacerlos. Lien david lista y tache las cosas con las que ya cumplió. Los logros aumentan la autoestima. Espere hasta que se sienta mejor antes de hacer las cosas que  encuentre difíciles o desagradables.  · En lo posible, trate de comenzar a hacer david actividad física. Incluso media hora de ejercicio diario le hará sentirse mejor. Massimo un paseo al sol o en un lugar rodeado por naturaleza ayuda a que se recupere de la depresión rápidamente. Si tiene un lugar preferido para caminar, aprovéchelo.  · Aumente aquellas actividades seguras que siempre le clark dado placer. Puede ser escuchar gonzalez música preferida, leer un buen libro, pintar un cuadro o ejecutar gonzalez instrumento favorito. Lien lo que sea que aleje gonzalez mente de la depresión.  · Mantenga gonzalez espacio de estar diego iluminado.  BUSQUE AYUDA   Póngase en contacto con david línea de suicidio, centro de crisis, o al centro de prevención del suicidio en busca de ayuda inmediatamente. Los centros locales pueden estar en un hospital, clínica, organización de servicios comunitarios, proveedores de servicios sociales o en el departamento de rosi.   · Comuníquese con el servicio de emergencias de gonzalez localidad (911 en los [place]).  · Llame a david línea de suicidio:  · 3-585-470-TALK (1-929.114.3516) en los [place].  · 6-175-EDHNIQN (1-772.101.1701) en los [place].  · 1-809.625.4587 en los [place] para consejeros que hablan en español.  · 8-516-881-4TTY (1-889.659.7161) en los [place] para los usuarios de TTY.  · Visite los siguientes sitios web para obtener más información y ayuda:  · National Suicide Prevention Lifeline (Prevención Nacional del Suicidio): www.suicidepreventionlifeline.org  · Hopeline: www.hopeline.com  · American Foundation for Suicide Prevention (Fundación nortemericana para la prevención del suicidio): www.afsp.org  · Para lesbianas, gays, bisexuales, transgénero o los que cuestionan gonzalez identidad sexual, comuníquese con The Carol Project:  · 1-015-9-U-CAROL (1-156.843.6576) en los [place].  · www.thetrevorproject.org  · En [facility] los recursos para tratamientos para cada provincia se  encuentran en listas disponibles bajo el elizabetho [facility] o similares. Otra shari de Centros de Crisis por provincia se encuentran en http://www.suicideprevention.ca/in-crisis-now/find-a-crisis-centre-now/crisis-centres  Document Released: 10/04/2007 Document Revised: 03/11/2013  ExitCare® Patient Information ©2014 [employer].  Depression, Adult  Depression refers to feeling sad, low, down in the dumps, blue, gloomy, or empty. In general, there are two kinds of depression:  1. Normal sadness or normal grief. This kind of depression is one that we all feel from time to time after upsetting life experiences, such as the loss of a job or the ending of a relationship. This kind of depression is considered normal, is short lived, and resolves within a few days to 2 weeks. Depression experienced after the loss of a loved one (bereavement) often lasts longer than 2 weeks but normally gets better with time.  2. Clinical depression. This kind of depression lasts longer than normal sadness or normal grief or interferes with your ability to function at home, at work, and in school. It also interferes with your personal relationships. It affects almost every aspect of your life. Clinical depression is an illness.  Symptoms of depression can also be caused by conditions other than those mentioned above, such as:  · Physical illness. Some physical illnesses, including underactive thyroid gland (hypothyroidism), severe anemia, specific types of cancer, diabetes, uncontrolled seizures, heart and lung problems, strokes, and chronic pain are commonly associated with symptoms of depression.  · Side effects of some prescription medicine. In some people, certain types of medicine can cause symptoms of depression.  · Substance abuse. Abuse of alcohol and illicit drugs can cause symptoms of depression.  SYMPTOMS  Symptoms of normal sadness and normal grief include the following:  · Feeling sad or crying for  short periods of time.  · Not caring about anything (apathy).  · Difficulty sleeping or sleeping too much.  · No longer able to enjoy the things you used to enjoy.  · Desire to be by oneself all the time (social isolation).  · Lack of energy or motivation.  · Difficulty concentrating or remembering.  · Change in appetite or weight.  · Restlessness or agitation.  Symptoms of clinical depression include the same symptoms of normal sadness or normal grief and also the following symptoms:  · Feeling sad or crying all the time.  · Feelings of guilt or worthlessness.  · Feelings of hopelessness or helplessness.  · Thoughts of suicide or the desire to harm yourself (suicidal ideation).  · Loss of touch with reality (psychotic symptoms). Seeing or hearing things that are not real (hallucinations) or having false beliefs about your life or the people around you (delusions and paranoia).  DIAGNOSIS   The diagnosis of clinical depression is usually based on how bad the symptoms are and how long they have lasted. Your health care provider will also ask you questions about your medical history and substance use to find out if physical illness, use of prescription medicine, or substance abuse is causing your depression. Your health care provider may also order blood tests.  TREATMENT   Often, normal sadness and normal grief do not require treatment. However, sometimes antidepressant medicine is given for bereavement to ease the depressive symptoms until they resolve.  The treatment for clinical depression depends on how bad the symptoms are but often includes antidepressant medicine, counseling with a mental health professional, or both. Your health care provider will help to determine what treatment is best for you.  Depression caused by physical illness usually goes away with appropriate medical treatment of the illness. If prescription medicine is causing depression, talk with your health care provider about stopping the  medicine, decreasing the dose, or changing to another medicine.  Depression caused by the abuse of alcohol or illicit drugs goes away when you stop using these substances. Some adults need professional help in order to stop drinking or using drugs.  SEEK IMMEDIATE MEDICAL CARE IF:  · You have thoughts about hurting yourself or others.  · You lose touch with reality (have psychotic symptoms).  · You are taking medicine for depression and have a serious side effect.  FOR MORE INFORMATION  · National [place] on Mental Illness: www.judah.org   · National [place] of Mental Health: www.nimh.nih.gov      This information is not intended to replace advice given to you by your health care provider. Make sure you discuss any questions you have with your health care provider.     Document Released: 12/15/2001 Document Revised: 01/08/2016 Document Reviewed: 03/18/2013  [employer] Interactive Patient Education ©2016 [employer]

## 2017-09-29 NOTE — ED NOTES
Pt taken off legal hold by KASEY, Sabino @ 2002.  Pt seen by alert team member, Radha Ramos.  Discharge instructions provided to pt. Pt states understanding.  Pt states all questions have been answered.  Copy of discharge provided to pt.  Signed copy in chart.  Prescriptions provided to pt, copy in chart. Pt states that all personal belongings are in possession.  Pt left with pt's wife. Pt is calm and cooperative. Denies SI/HI.

## 2017-09-29 NOTE — DISCHARGE PLANNING
Medical Social Work:  FLORENTIN asked to assist with Pt and possible DC back to [facility].  Pt was sent to ED by [facility] for Medical Clearance and was to return to [facility] for admission on a Legal Hold.  Pt presented to ED with [facility] escort. Upon arrival it was noted that Legal Hold had the name mulugeta. Name should be Mahamed Mae and it was written as Mahamed Hernandez. Due to improper spelling validity of Legal Hold was in question.    Pt was checked into ED and was seen by Alert team and Dr. Gallo. Both medical personnel determined the Pt to not be SI and released the Legal Hold.    Florentin spoke to Ty at [facility] who states Pt is currently admitted at their facility and needs to be transported back via [facility]. FLORENTIN explained Pt Legal Hold not valid due to name spelled wrong and that ED physician and our Alert Team have re evaluated Pt and state he is not SI and have DC'd the hold.  FLORENTIN did attempt to call [facility] spoke to [place]-transportation declined as Pt not on Legal Hold. [facility] states they will transfer but it is Pt responsibility. Ty insistent that Pt be transported by [facility] and that a taxi is not appropriate- eventhough Pt is cooperative, with his wife and a [facility] attendant.    Both FLORENTIN and Ty decided to escalate situation to Supervisors.    Phone call to Ayaz Crawford contacted FLORENTIN and stated he spoke to his Supervisor and people above his supervisor and they have determined to Discharge Pt stating that since our ED and alert team DCd Legal Hold that Pt is our responsibility .    Pt was dcd home with his wife. Pt has a follow up plan in place per Alert Team and RN.

## 2017-09-29 NOTE — ED NOTES
Report received from THU Landers. Pt given boxed lunch. Pt is calm and cooperative. Resp are even and unlabored.   Awaiting assesment by alert team

## 2017-09-29 NOTE — ED NOTES
"Chief Complaint   Patient presents with   • Medical Clearance     BIB EMS from [facility], on legal hold for SI. pt denies SI.   • Legal 2000     Pt reports that he is a heroin addict, relapsed a few days ago. Reports that he went to [place] to get back on his medications and they placed him on a legal hold. Name on legal hold is \"Mahamed Hernandez.\" pt's name is Brooklyn Mae. Pt denies SI, states that he is all set up for outpatient therapy and that there was some insurance mix up so they sent him here. [facility] staff with pt.   "
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
## 2017-09-29 NOTE — ED NOTES
Pt legal hold appears to have wrong name.  Legal hold placed on Mahamed Hernandez  Pt's legal name is Mahamed Mae

## 2017-11-01 ENCOUNTER — HOSPITAL ENCOUNTER (EMERGENCY)
Facility: MEDICAL CENTER | Age: 32
End: 2017-11-03
Attending: EMERGENCY MEDICINE
Payer: MEDICAID

## 2017-11-01 DIAGNOSIS — R56.9 SEIZURE (HCC): ICD-10-CM

## 2017-11-01 DIAGNOSIS — F33.9 RECURRENT MAJOR DEPRESSIVE DISORDER, REMISSION STATUS UNSPECIFIED (HCC): ICD-10-CM

## 2017-11-01 PROCEDURE — 302970 POC BREATHALIZER: Performed by: EMERGENCY MEDICINE

## 2017-11-01 PROCEDURE — 99285 EMERGENCY DEPT VISIT HI MDM: CPT

## 2017-11-01 RX ORDER — LEVETIRACETAM 500 MG/1
500 TABLET ORAL ONCE
Status: COMPLETED | OUTPATIENT
Start: 2017-11-01 | End: 2017-11-02

## 2017-11-01 RX ORDER — OLANZAPINE 5 MG/1
5 TABLET ORAL
COMMUNITY
End: 2018-04-18

## 2017-11-01 RX ORDER — DIAZEPAM 5 MG/ML
5 INJECTION, SOLUTION INTRAMUSCULAR; INTRAVENOUS ONCE
Status: DISCONTINUED | OUTPATIENT
Start: 2017-11-01 | End: 2017-11-01

## 2017-11-01 RX ORDER — SODIUM CHLORIDE 9 MG/ML
1000 INJECTION, SOLUTION INTRAVENOUS ONCE
Status: COMPLETED | OUTPATIENT
Start: 2017-11-01 | End: 2017-11-02

## 2017-11-01 RX ORDER — LEVETIRACETAM 500 MG/1
500 TABLET ORAL 2 TIMES DAILY
COMMUNITY
End: 2018-04-18 | Stop reason: SDUPTHER

## 2017-11-01 RX ORDER — QUETIAPINE FUMARATE 100 MG/1
100-400 TABLET, FILM COATED ORAL 2 TIMES DAILY
COMMUNITY
End: 2017-11-02

## 2017-11-01 RX ORDER — MIDAZOLAM HYDROCHLORIDE 1 MG/ML
1 INJECTION INTRAMUSCULAR; INTRAVENOUS ONCE
Status: COMPLETED | OUTPATIENT
Start: 2017-11-01 | End: 2017-11-02

## 2017-11-02 LAB
ALBUMIN SERPL BCP-MCNC: 4.1 G/DL (ref 3.2–4.9)
ALBUMIN/GLOB SERPL: 1.2 G/DL
ALP SERPL-CCNC: 66 U/L (ref 30–99)
ALT SERPL-CCNC: 62 U/L (ref 2–50)
AMPHET UR QL SCN: POSITIVE
ANION GAP SERPL CALC-SCNC: 8 MMOL/L (ref 0–11.9)
AST SERPL-CCNC: 30 U/L (ref 12–45)
BARBITURATES UR QL SCN: NEGATIVE
BASOPHILS # BLD AUTO: 0.3 % (ref 0–1.8)
BASOPHILS # BLD: 0.04 K/UL (ref 0–0.12)
BENZODIAZ UR QL SCN: POSITIVE
BILIRUB SERPL-MCNC: 0.5 MG/DL (ref 0.1–1.5)
BUN SERPL-MCNC: 11 MG/DL (ref 8–22)
BZE UR QL SCN: NEGATIVE
CALCIUM SERPL-MCNC: 9.3 MG/DL (ref 8.5–10.5)
CANNABINOIDS UR QL SCN: NEGATIVE
CHLORIDE SERPL-SCNC: 105 MMOL/L (ref 96–112)
CO2 SERPL-SCNC: 25 MMOL/L (ref 20–33)
CREAT SERPL-MCNC: 0.73 MG/DL (ref 0.5–1.4)
EOSINOPHIL # BLD AUTO: 0.02 K/UL (ref 0–0.51)
EOSINOPHIL NFR BLD: 0.1 % (ref 0–6.9)
ERYTHROCYTE [DISTWIDTH] IN BLOOD BY AUTOMATED COUNT: 42.6 FL (ref 35.9–50)
GFR SERPL CREATININE-BSD FRML MDRD: >60 ML/MIN/1.73 M 2
GLOBULIN SER CALC-MCNC: 3.3 G/DL (ref 1.9–3.5)
GLUCOSE SERPL-MCNC: 111 MG/DL (ref 65–99)
HCT VFR BLD AUTO: 46.4 % (ref 42–52)
HGB BLD-MCNC: 15.8 G/DL (ref 14–18)
IMM GRANULOCYTES # BLD AUTO: 0.08 K/UL (ref 0–0.11)
IMM GRANULOCYTES NFR BLD AUTO: 0.6 % (ref 0–0.9)
LYMPHOCYTES # BLD AUTO: 3.1 K/UL (ref 1–4.8)
LYMPHOCYTES NFR BLD: 21.5 % (ref 22–41)
MCH RBC QN AUTO: 30.4 PG (ref 27–33)
MCHC RBC AUTO-ENTMCNC: 34.1 G/DL (ref 33.7–35.3)
MCV RBC AUTO: 89.4 FL (ref 81.4–97.8)
METHADONE UR QL SCN: NEGATIVE
MONOCYTES # BLD AUTO: 0.88 K/UL (ref 0–0.85)
MONOCYTES NFR BLD AUTO: 6.1 % (ref 0–13.4)
NEUTROPHILS # BLD AUTO: 10.3 K/UL (ref 1.82–7.42)
NEUTROPHILS NFR BLD: 71.4 % (ref 44–72)
NRBC # BLD AUTO: 0 K/UL
NRBC BLD AUTO-RTO: 0 /100 WBC
OPIATES UR QL SCN: POSITIVE
OXYCODONE UR QL SCN: NEGATIVE
PCP UR QL SCN: NEGATIVE
PLATELET # BLD AUTO: 264 K/UL (ref 164–446)
PMV BLD AUTO: 8.9 FL (ref 9–12.9)
POC BREATHALIZER: 0 PERCENT (ref 0–0.01)
POTASSIUM SERPL-SCNC: 3.7 MMOL/L (ref 3.6–5.5)
PROPOXYPH UR QL SCN: NEGATIVE
PROT SERPL-MCNC: 7.4 G/DL (ref 6–8.2)
RBC # BLD AUTO: 5.19 M/UL (ref 4.7–6.1)
SODIUM SERPL-SCNC: 138 MMOL/L (ref 135–145)
WBC # BLD AUTO: 14.4 K/UL (ref 4.8–10.8)

## 2017-11-02 PROCEDURE — 700111 HCHG RX REV CODE 636 W/ 250 OVERRIDE (IP): Performed by: EMERGENCY MEDICINE

## 2017-11-02 PROCEDURE — 36415 COLL VENOUS BLD VENIPUNCTURE: CPT

## 2017-11-02 PROCEDURE — 80053 COMPREHEN METABOLIC PANEL: CPT

## 2017-11-02 PROCEDURE — A9270 NON-COVERED ITEM OR SERVICE: HCPCS | Performed by: EMERGENCY MEDICINE

## 2017-11-02 PROCEDURE — 90791 PSYCH DIAGNOSTIC EVALUATION: CPT

## 2017-11-02 PROCEDURE — 700102 HCHG RX REV CODE 250 W/ 637 OVERRIDE(OP): Performed by: EMERGENCY MEDICINE

## 2017-11-02 PROCEDURE — 700105 HCHG RX REV CODE 258: Performed by: EMERGENCY MEDICINE

## 2017-11-02 PROCEDURE — 85025 COMPLETE CBC W/AUTO DIFF WBC: CPT

## 2017-11-02 PROCEDURE — 80307 DRUG TEST PRSMV CHEM ANLYZR: CPT

## 2017-11-02 PROCEDURE — 96374 THER/PROPH/DIAG INJ IV PUSH: CPT

## 2017-11-02 PROCEDURE — 96361 HYDRATE IV INFUSION ADD-ON: CPT

## 2017-11-02 RX ORDER — LITHIUM CARBONATE 300 MG/1
300-600 CAPSULE ORAL 2 TIMES DAILY WITH MEALS
COMMUNITY
End: 2018-04-18

## 2017-11-02 RX ORDER — QUETIAPINE FUMARATE 200 MG/1
200-400 TABLET, FILM COATED ORAL
COMMUNITY
End: 2018-04-18

## 2017-11-02 RX ORDER — OLANZAPINE 5 MG/1
5 TABLET ORAL EVERY EVENING
Status: DISCONTINUED | OUTPATIENT
Start: 2017-11-02 | End: 2017-11-03

## 2017-11-02 RX ORDER — LEVETIRACETAM 500 MG/1
500 TABLET ORAL 2 TIMES DAILY
Status: DISCONTINUED | OUTPATIENT
Start: 2017-11-02 | End: 2017-11-03 | Stop reason: HOSPADM

## 2017-11-02 RX ORDER — QUETIAPINE FUMARATE 25 MG/1
150 TABLET, FILM COATED ORAL EVERY EVENING
Status: DISCONTINUED | OUTPATIENT
Start: 2017-11-02 | End: 2017-11-03 | Stop reason: HOSPADM

## 2017-11-02 RX ADMIN — LEVETIRACETAM 500 MG: 500 TABLET, FILM COATED ORAL at 21:40

## 2017-11-02 RX ADMIN — QUETIAPINE FUMARATE 150 MG: 25 TABLET ORAL at 22:05

## 2017-11-02 RX ADMIN — QUETIAPINE FUMARATE 150 MG: 25 TABLET ORAL at 02:56

## 2017-11-02 RX ADMIN — SODIUM CHLORIDE 1000 ML: 9 INJECTION, SOLUTION INTRAVENOUS at 00:33

## 2017-11-02 RX ADMIN — OLANZAPINE 5 MG: 5 TABLET, FILM COATED ORAL at 02:56

## 2017-11-02 RX ADMIN — MIDAZOLAM HYDROCHLORIDE 1 MG: 1 INJECTION, SOLUTION INTRAMUSCULAR; INTRAVENOUS at 00:31

## 2017-11-02 RX ADMIN — OLANZAPINE 5 MG: 5 TABLET, FILM COATED ORAL at 21:40

## 2017-11-02 RX ADMIN — LEVETIRACETAM 500 MG: 500 TABLET, FILM COATED ORAL at 00:30

## 2017-11-02 NOTE — ED NOTES
"Pt states, \"i will drink a big cup of water right now so I can give a urine sample.\" Pt calm and cooperative at this time.   "
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
## 2017-11-02 NOTE — ED NOTES
"Chief Complaint   Patient presents with   • Seizure     X's 2. One witnessed. Per EMS +loc and patient did not hit his head.      /82   Pulse 74   Temp 37.3 °C (99.1 °F)   Resp 16   Ht 1.651 m (5' 5\")   Wt 72.6 kg (160 lb)   BMI 26.63 kg/m²     Patient placed in gown and on monitor.   "

## 2017-11-02 NOTE — ED NOTES
Pt moved from GRN24 to GRN31. Pt resting comfortably in bed, no s/s or pain or discomfort noted. Sitter in place.

## 2017-11-02 NOTE — ED NOTES
Med rec complete per patient and [facility]  Allergies reviewed    Patient has rx for Seroquel 100 mg BID PRN also but stated he doesn't take during the day.    D/C'd Lithium a few weeks ago per him and

## 2017-11-02 NOTE — ED NOTES
Patient has a SAD score of 6. Stated that he was suicidal. Legal hold initiated. All belongings taken off patient and put in patient storage. All items removed from room. Sitter at bedside.

## 2017-11-02 NOTE — DISCHARGE PLANNING
Alert team note:  Patient was assessed by Jeimy Mcfarlane [facility]  RN.  Patient continues to have difficulty staying awake for Jeimy or Dr Ramirez's assessment.  See Dr Ramirez note.  She stated his seizures need addressed.  This writer also attempted to go in to talk to him not waking up or talking to anyone trying to make assessments.  However, he did wake up and had lunch.
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
## 2017-11-02 NOTE — ED PROVIDER NOTES
ED Provider Note    11/2. This is a patient awaiting transfer to psychiatric facility. Currently no complaints pleasant cooperative vital signs have remained normal    11/3 this is a patient awaiting transfer to psychiatric facility. Currently remains pleasant, no complaints, vital signs have remained normal

## 2017-11-02 NOTE — PROGRESS NOTES
Patient's home medications have been reviewed by the pharmacy team.     Patient's Medications   New Prescriptions    No medications on file   Previous Medications    LEVETIRACETAM (KEPPRA) 500 MG TAB    Take 500 mg by mouth 2 times a day.    OLANZAPINE (ZYPREXA) 5 MG TAB    Take 5 mg by mouth every evening.    QUETIAPINE (SEROQUEL) 100 MG TAB    Take 150 mg by mouth Once.   Modified Medications    No medications on file   Discontinued Medications    HYDROXYZINE (ATARAX) 50 MG TAB    Take 50 mg by mouth 2 Times a Day.    LITHIUM CR (ESKALITH CR) 450 MG TAB CR    Take 500 mg by mouth 3 times a day.    METHADONE (DOLOPHINE) 40 MG TABLET SOLUBLE    Take 45 mg by mouth every day.    TOPIRAMATE (TOPAMAX) 100 MG TAB    Take 1 Tab by mouth 2 times a day.         A:  The following pharmacotherapy concerns may be contributing to current complaints:  Recently stopping Lithium.  - Discussed with patient, stopped Lithium 3 weeks ago and started Zyprexa 1 week ago per Psych MD.  Has been off and on Seroquel of years.      P:    Home medications have been reordered as appropriate per ERP. Defer additional management to psych.    Nazario George, PharmD, BCPS

## 2017-11-02 NOTE — PSYCHIATRY
PSYCHIATRIC CONSULTATION:seen. Too obtunded to interview. Slurred speech. This patient has a known sz disorder, has, per notes, had 2 seizures and mentioned he was SI. Until he is assessed by neurology and his sz are stabilized we will not extend the legal hold. His sz disorder must be addressed first.

## 2017-11-02 NOTE — ED PROVIDER NOTES
"ED Provider Note    CHIEF COMPLAINT  Chief Complaint   Patient presents with   • Seizure     X's 2. One witnessed. Per EMS +loc and patient did not hit his head.        HPI  Mahamed Mae is a 32 y.o. male who presentsTo the emergency department after 2 generalized seizures. The patient has a known seizure disorder. He states he has not seen a neurologist in quite some time. He states he seizes approximately once a week. The patient states been compliant with his medication however today he did run out and missed his dose of Keppra and Seroquel. He states he's also been having a difficult time sleeping recently due to his bipolar disorder. He is unaware of any injuries from his seizures. He does not have any pain at this time. He states he has not had any vomiting or diarrhea. He has no associated fevers.    REVIEW OF SYSTEMS  See HPI for further details. All other systems are negative.     PAST MEDICAL HISTORY  Past Medical History:   Diagnosis Date   • Psychiatric disorder     bipolar   • Seizure disorder (CMS-[facility])        SOCIAL HISTORY  Social History     Social History   • Marital status: Single     Spouse name: N/A   • Number of children: N/A   • Years of education: N/A     Social History Main Topics   • Smoking status: Current Every Day Smoker     Packs/day: 0.50   • Smokeless tobacco: Current User     Types: Chew   • Alcohol use No   • Drug use:      Types: Intravenous   • Sexual activity: Not on file     Other Topics Concern   • Not on file     Social History Narrative   • No narrative on file           PHYSICAL EXAM  VITAL SIGNS: /82   Pulse 74   Temp 37.3 °C (99.1 °F)   Resp 16   Ht 1.651 m (5' 5\")   Wt 72.6 kg (160 lb)   BMI 26.63 kg/m²   Constitutional: Well developed, Well nourished, No acute distress, Non-toxic appearance.   HENT: Normocephalic, Atraumatic, tympanic membranes are intact and nonerythematous bilaterally, Oropharynx moist without exudates or erythema, Nose normal. "   Eyes: PERRLA, EOMI, Conjunctiva normal.  Neck: Supple without meningismus  Lymphatic: No lymphadenopathy noted.   Cardiovascular: Normal heart rate, Normal rhythm, No murmurs, No rubs, No gallops.   Thorax & Lungs: Normal breath sounds, No respiratory distress, No wheezing, No chest tenderness.   Abdomen: Bowel sounds normal, Soft, No tenderness, no rebound, no guarding, no distention, No masses, No pulsatile masses.   Skin: Warm, Dry, No erythema, No rash.   Back: No tenderness, No CVA tenderness.   Extremities: Atraumatic with symmetric distal pulses, No edema, No tenderness, No cyanosis, No clubbing.   Neurologic: Alert & oriented x 3, cranial nerves II through XII are intact, Normal motor function, Normal sensory function, No focal deficits noted.   Psychiatric: Affect normal, Judgment normal, Mood normal.       COURSE & MEDICAL DECISION MAKING  Pertinent Labs & Imaging studies reviewed. (See chart for details)  This a 32-year-old male who presents to emergency department after couple of seizures. Suspect the patient's insomnia and bipolar disease has caused an exacerbation of his seizures. The patient did miss his dose of Keppra this evening as well Seroquel. Therefore he received his dose of Keppra in the emergency department as well as benzodiazepines intravenously for seizure prophylaxis. We'll place the patient back on his seizure medication. The patient did start to have suicidal ideation and states he has a plan to overdose due to his bad choices. The patient was therefore divided by life skills and they have placed the patient on a legal hold. Therefore the patient is placed back on his seizure medication. At this time he is medically clear for psychiatric care.    FINAL IMPRESSION  1. Seizure disorder  2. Suicidal ideation     Disposition  The patient will be transferred for psychiatric care    Electronically signed by: Dakota Henry, 11/1/2017 11:10 PM

## 2017-11-02 NOTE — DISCHARGE PLANNING
Medical Social Work    Referral: Legal Hold    Intervention: Legal Hold Paperwork given to SW by [facility] RN Radha Raoms    Legal Hold Initiated: Date: 11-2-2017 Time: 2137    Patient’s Insurance Listed on Face Sheet: Medicaid HMO    Referrals sent to: [facility], [place]    This referral contains the following information:  1) Face sheet x____  2) Page 1 and Page 2 of Legal Hold _x___  3) Alert Team Assessment/Psych Assessment _x___  4) Head to toe physical exam ___x_  5) Urine Drug Screen x____  6) Blood Alcohol __x__  7) Vital signs _x___  8) Pregnancy test when applicable _na__  9) Medications list _x___    Plan: Patient will transfer to mental health facility once acceptance is obtained

## 2017-11-02 NOTE — ED NOTES
Unable to obtain med rec.  Could not wake patient up after several attempts. Will come back later.

## 2017-11-03 VITALS
OXYGEN SATURATION: 96 % | HEIGHT: 65 IN | HEART RATE: 62 BPM | BODY MASS INDEX: 26.66 KG/M2 | RESPIRATION RATE: 16 BRPM | DIASTOLIC BLOOD PRESSURE: 64 MMHG | TEMPERATURE: 98 F | SYSTOLIC BLOOD PRESSURE: 111 MMHG | WEIGHT: 160 LBS

## 2017-11-03 PROCEDURE — A9270 NON-COVERED ITEM OR SERVICE: HCPCS | Performed by: EMERGENCY MEDICINE

## 2017-11-03 PROCEDURE — 700102 HCHG RX REV CODE 250 W/ 637 OVERRIDE(OP): Performed by: EMERGENCY MEDICINE

## 2017-11-03 RX ADMIN — LEVETIRACETAM 500 MG: 500 TABLET, FILM COATED ORAL at 09:27

## 2017-11-03 ASSESSMENT — PAIN SCALES - GENERAL: PAINLEVEL_OUTOF10: 0

## 2017-11-03 NOTE — PSYCHIATRY
"PSYCHIATRIC CONSULTATION:  Reason for admission: presentsTo the emergency department after 2 generalized seizures. The patient has a known seizure disorder. He states he has not seen a neurologist in quite some time. He states he seizes approximately once a week. The patient states been compliant with his medication however today he did run out and missed his dose of Keppra and Seroquel. Stated he was SI.    Reason for consult: suicidal  Requesting Physician:Dakota Henry M.D.    Legal status:  +    Chief Complaint: not suicidal but is depressed.    HPI: 31 yo male who has recurrent depression. Relapsed on heroin IV after being clean for \"a long time\" after wife of 2 years left 6 weeks ago. She left because \"I was a jerk\". He denies any other available supports. He is now homeless though he was working 2 months ago. He has trouble sleeping (but he is also on the streets), feels irritable, has no appetite (ate here), hopeless but wanting to go to a rehab.     States that \"nothing has ever worked\" for his depression and he has been on many SSRIs. He doesn't know if seroquel is helpful for anything other than sleep.     Gets anxious and sometimes has a panic attack, \"I can't explain it\". No psychosis, no amaury.    Psychiatric Review of Systems:current symptoms as reported by pt.      Medical Review of Systems: as reported by pt. All systems reviewed. Only those found to be + are noted below. All others are negative.   Neurological:    TBIs:denies   SZs:grand mal type   Strokes:denies  Other medical symptoms:denies     Psychiatric Examination: observed phenomenon:  Vitals:Blood pressure 111/64, pulse 62, temperature 36.7 °C (98 °F), resp. rate 16, height 1.651 m (5' 5\"), weight 72.6 kg (160 lb), SpO2 96 %.  Musculoskeletal(abnormal movements, gait, etc):none noted.  Appearance:unkempt, poor hygiene, good eye contact, normal habitus  Thoughts:linear, no psychosis  Speech:soft, tends to " "mumble  Mood:depressed  Affect: blunted  SI/HI:denies  Attention/Alertness:   intact  Memory: intact  Orientation: x 4  Fund of Knowledge: not tested     Insight/Judgement into symptoms: fair     Past Psychiatric Hx:4-5 SAs with 4-5 hospitalizations. Has been on different SSRIs but not all, has been on depakote and got a rash, on lithium which didn't do anything. Pt describes manias as lasting a few days with increased talking and energy. Denies hx of psychosis.  Did \"okay\" when he was sober x 8 months. Doesn't like his doctor.    Family Psychiatric Hx: not assessed.    Social Hx:as noted.     Drug/Alcohol/Tobacco Hx:   Drugs:heroin IV   Alcohol:denies    Medical Hx: labs, MARS, medications, etc were reviewed. Only those findings of potential interest to psychiatry are noted below:  Medical Conditions: SZ disorder    Allergies: Ativan and Sulfa drugs    Medications (currently prescribed at [facility]):keppra 500 mg bid, seroquel 150 mg daily.  Labs:Results for CAM TUTTLE (MRN [id number]) as of 11/3/2017 13:43   Ref. Range 11/2/2017 00:05   Neutrophils (Absolute) Latest Ref Range: 1.82 - 7.42 K/uL 10.30 (H)   UDS + amphetamines, BZ, opiates.  Alcohol negative  ECG: none    ASSESSMENT: (new dx, acuity level)  Heroin Use Disorder  Depressive Disorder [facility]    PLAN:becasue he hasn't been on paxil, will add that to an increase in seroquel (200 mg) 30 days on script of each, only 10 days can be dispensed at a time. Has follow up with Dr Aly office, an APN. Alert Team to assist with calling rehabs and putting name on. ED doctor notified to address whether or not he should also get a script for keppra.   Legal status: dc'd.    Signing off   Thank you for the consult.  "

## 2017-11-03 NOTE — DISCHARGE PLANNING
Alert team note:  Patient denies any SI, HI or any hallucinations or delusions. He has cleared from the drugs he was under-the-influence of when he came to the ER about 40 hours ago.  He is a patient at Dr Edin Alexander' office.  He sees an APN there.  Encouraged him to call [facility] for a therapy appointment since he has medicaid HMO and was screened by [facility].  Patient was provided numbers to contact 8 rehabs with 9 numbers.  He said he called them and left messages. Encouraged him to call them daily.  General counseling resources also provided so he can determine who would be his best support and treatment.

## 2017-11-03 NOTE — DISCHARGE INSTRUCTIONS
Depression, Adult  Depression is feeling sad, low, down in the dumps, blue, gloomy, or empty. In general, there are two kinds of depression:  · Normal sadness or grief. This can happen after something upsetting. It often goes away on its own within 2 weeks. After losing a loved one (bereavement), normal sadness and grief may last longer than two weeks. It usually gets better with time.  · Clinical depression. This kind lasts longer than normal sadness or grief. It keeps you from doing the things you normally do in life. It is often hard to function at home, work, or at school. It may affect your relationships with others. Treatment is often needed.  GET HELP RIGHT AWAY IF:  · You have thoughts about hurting yourself or others.  · You lose touch with reality (psychotic symptoms). You may:  ¨ See or hear things that are not real.  ¨ Have untrue beliefs about your life or people around you.  · Your medicine is giving you problems.  MAKE SURE YOU:  · Understand these instructions.  · Will watch your condition.  · Will get help right away if you are not doing well or get worse.     This information is not intended to replace advice given to you by your health care provider. Make sure you discuss any questions you have with your health care provider.     Document Released: 01/20/2012 Document Revised: 01/08/2016 Document Reviewed: 04/18/2013  [employer] Interactive Patient Education ©2016 [employer]    Epilepsy  Epilepsy is a disorder in which a person has repeated seizures over time. A seizure is a release of abnormal electrical activity in the brain. Seizures can cause a change in attention, behavior, or the ability to remain awake and alert (altered mental status). Seizures often involve uncontrollable shaking (convulsions).   Most people with epilepsy lead normal lives. However, people with epilepsy are at an increased risk of falls, accidents, and injuries. Therefore, it is important to begin treatment right  away.  CAUSES   Epilepsy has many possible causes. Anything that disturbs the normal pattern of brain cell activity can lead to seizures. This may include:   · Head injury.  · Birth trauma.  · High fever as a child.  · Stroke.  · Bleeding into or around the brain.  · Certain drugs.  · Prolonged low oxygen, such as what occurs after CPR efforts.  · Abnormal brain development.  · Certain illnesses, such as meningitis, encephalitis (brain infection), malaria, and other infections.  · An imbalance of nerve signaling chemicals (neurotransmitters).    SIGNS AND SYMPTOMS   The symptoms of a seizure can vary greatly from one person to another. Right before a seizure, you may have a warning (aura) that a seizure is about to occur. An aura may include the following symptoms:  · Fear or anxiety.  · Nausea.  · Feeling like the room is spinning (vertigo).  · Vision changes, such as seeing flashing lights or spots.  Common symptoms during a seizure include:  · Abnormal sensations, such as an abnormal smell or a bitter taste in the mouth.    · Sudden, general body stiffness.    · Convulsions that involve rhythmic jerking of the face, arm, or leg on one or both sides.    · Sudden change in consciousness.    ¨ Appearing to be awake but not responding.    ¨ Appearing to be asleep but cannot be awakened.    · Grimacing, chewing, lip smacking, drooling, tongue biting, or loss of bowel or bladder control.  After a seizure, you may feel sleepy for a while.   DIAGNOSIS   Your health care provider will ask about your symptoms and take a medical history. Descriptions from any witnesses to your seizures will be very helpful in the diagnosis. A physical exam, including a detailed neurological exam, is necessary. Various tests may be done, such as:   · An electroencephalogram (EEG). This is a painless test of your brain waves. In this test, a diagram is created of your brain waves. These diagrams can be interpreted by a specialist.  · An MRI  of the brain.    · A CT scan of the brain.    · A spinal tap (lumbar puncture, LP).  · Blood tests to check for signs of infection or abnormal blood chemistry.  TREATMENT   There is no cure for epilepsy, but it is generally treatable. Once epilepsy is diagnosed, it is important to begin treatment as soon as possible. For most people with epilepsy, seizures can be controlled with medicines. The following may also be used:  · A pacemaker for the brain (vagus nerve stimulator) can be used for people with seizures that are not well controlled by medicine.  · Surgery on the brain.  For some people, epilepsy eventually goes away.  HOME CARE INSTRUCTIONS   · Follow your health care provider's recommendations on driving and safety in normal activities.  · Get enough rest. Lack of sleep can cause seizures.  · Only take over-the-counter or prescription medicines as directed by your health care provider. Take any prescribed medicine exactly as directed.  · Avoid any known triggers of your seizures.  · Keep a seizure diary. Record what you recall about any seizure, especially any possible trigger.    · Make sure the people you live and work with know that you are prone to seizures. They should receive instructions on how to help you. In general, a witness to a seizure should:    ¨ Cushion your head and body.    ¨ Turn you on your side.    ¨ Avoid unnecessarily restraining you.    ¨ Not place anything inside your mouth.    ¨ Call for emergency medical help if there is any question about what has occurred.    · Follow up with your health care provider as directed. You may need regular blood tests to monitor the levels of your medicine.    SEEK MEDICAL CARE IF:   · You develop signs of infection or other illness. This might increase the risk of a seizure.    · You seem to be having more frequent seizures.    · Your seizure pattern is changing.    SEEK IMMEDIATE MEDICAL CARE IF:   · You have a seizure that does not stop after a few  moments.    · You have a seizure that causes any difficulty in breathing.    · You have a seizure that results in a very severe headache.    · You have a seizure that leaves you with the inability to speak or use a part of your body.       This information is not intended to replace advice given to you by your health care provider. Make sure you discuss any questions you have with your health care provider.     Document Released: 12/18/2006 Document Revised: 10/08/2014 Document Reviewed: 07/30/2014  [employer] Interactive Patient Education ©2016 [employer]

## 2017-11-03 NOTE — ED PROVIDER NOTES
ED Provider Note Addendum    Scribed for Tristan Christopher M.D. by Vish Miller on 11/3/2017 at 1:31 PM.     This is an addendum to the note on Mahamed Mae.  For further details and full chart information, see patient's initial note.       1:31 PM - I discussed the patient's case with Dr. Brito ([facility]) who will transfer care of the patient to me at this time.        1:31 PM  - Patient evaluated by myself. He has been seen by Psychiatry and will be discharged with plans. On my exam he is awake and alert. Patient verbalized that he will not harm himself.     Disposition:  Patient will be discharged with plans from Psychiatry.     FINAL IMPRESSION  1. Seizure (CMS-HCC)    2. Recurrent major depressive disorder, remission status unspecified (CMS-HCC)      Vish GARCIA (Scribe), am scribing for, and in the presence of, Tristan Christopher M.D.    Electronically signed by: Vish Miller (Scribe), 11/3/2017    ITristan M.D. personally performed the services described in this documentation, as scribed by Vish Miller in my presence, and it is both accurate and complete.    The note accurately reflects work and decisions made by me.  Tristan Christopher  11/3/2017  8:44 PM

## 2017-11-03 NOTE — ED NOTES
IV D/C'd.  Discharge instructions provided to pt.  Pt states understanding.  Pt states all questions have been answered. Copy of discharge provided to pt.  Signed copy in chart.  Prescriptions provided to pt x2 Pt states that all personal belongings are in possession.  Pt escorted off unit with assistance from [address] [place] [address] without incident.

## 2017-11-07 NOTE — DISCHARGE PLANNING
11/7/2017 1030    CM received call from pt's wife requesting information regarding his last visit. Information given to Gama LERMA.

## 2017-11-07 NOTE — DISCHARGE PLANNING
SW spoke with pt and spouse on the phone. After having the dates of his stay clarified, they reported that they had no further needs as they had thought that the pt had been seen for another ER visit after this d/c. SW to remain available.

## 2017-12-14 ENCOUNTER — HOSPITAL ENCOUNTER (EMERGENCY)
Dept: HOSPITAL 8 - ED | Age: 32
Discharge: HOME | End: 2017-12-14
Payer: COMMERCIAL

## 2017-12-14 VITALS — SYSTOLIC BLOOD PRESSURE: 121 MMHG | DIASTOLIC BLOOD PRESSURE: 78 MMHG

## 2017-12-14 VITALS — HEIGHT: 64 IN | BODY MASS INDEX: 24.46 KG/M2 | WEIGHT: 143.3 LBS

## 2017-12-14 DIAGNOSIS — R56.9: Primary | ICD-10-CM

## 2017-12-14 PROCEDURE — 99284 EMERGENCY DEPT VISIT MOD MDM: CPT

## 2017-12-14 PROCEDURE — 70450 CT HEAD/BRAIN W/O DYE: CPT

## 2018-04-18 ENCOUNTER — TELEPHONE (OUTPATIENT)
Dept: INTERNAL MEDICINE | Facility: MEDICAL CENTER | Age: 33
End: 2018-04-18

## 2018-04-18 ENCOUNTER — OFFICE VISIT (OUTPATIENT)
Dept: INTERNAL MEDICINE | Facility: MEDICAL CENTER | Age: 33
End: 2018-04-18
Payer: MEDICAID

## 2018-04-18 VITALS
OXYGEN SATURATION: 97 % | HEART RATE: 82 BPM | HEIGHT: 66 IN | WEIGHT: 180 LBS | DIASTOLIC BLOOD PRESSURE: 84 MMHG | SYSTOLIC BLOOD PRESSURE: 122 MMHG | TEMPERATURE: 97.8 F | BODY MASS INDEX: 28.93 KG/M2

## 2018-04-18 DIAGNOSIS — D72.829 LEUKOCYTOSIS, UNSPECIFIED TYPE: ICD-10-CM

## 2018-04-18 DIAGNOSIS — R56.9 SEIZURES (HCC): ICD-10-CM

## 2018-04-18 DIAGNOSIS — F32.A DEPRESSION, UNSPECIFIED DEPRESSION TYPE: ICD-10-CM

## 2018-04-18 DIAGNOSIS — F31.9 BIPOLAR 1 DISORDER (HCC): ICD-10-CM

## 2018-04-18 DIAGNOSIS — B19.20 HEPATITIS C VIRUS INFECTION WITHOUT HEPATIC COMA, UNSPECIFIED CHRONICITY: ICD-10-CM

## 2018-04-18 DIAGNOSIS — F19.90 SUBSTANCE USE DISORDER: ICD-10-CM

## 2018-04-18 PROCEDURE — 99204 OFFICE O/P NEW MOD 45 MIN: CPT | Mod: GC | Performed by: INTERNAL MEDICINE

## 2018-04-18 RX ORDER — TRAZODONE HYDROCHLORIDE 100 MG/1
100 TABLET ORAL
Qty: 30 TAB | Refills: 0 | Status: SHIPPED | OUTPATIENT
Start: 2018-04-18 | End: 2018-05-01 | Stop reason: SINTOL

## 2018-04-18 RX ORDER — LEVETIRACETAM 500 MG/1
500 TABLET ORAL 2 TIMES DAILY
Qty: 60 TAB | Refills: 0 | Status: SHIPPED | OUTPATIENT
Start: 2018-04-18 | End: 2018-05-01 | Stop reason: SDUPTHER

## 2018-04-18 RX ORDER — ARIPIPRAZOLE 15 MG/1
15 TABLET ORAL 2 TIMES DAILY
Qty: 60 TAB | Refills: 0 | Status: SHIPPED | OUTPATIENT
Start: 2018-04-18 | End: 2018-04-19

## 2018-04-18 RX ORDER — ARIPIPRAZOLE 15 MG/1
15 TABLET ORAL 2 TIMES DAILY
COMMUNITY
End: 2018-04-18 | Stop reason: SDUPTHER

## 2018-04-18 RX ORDER — TRAZODONE HYDROCHLORIDE 100 MG/1
100 TABLET ORAL NIGHTLY
COMMUNITY
End: 2018-04-18 | Stop reason: SDUPTHER

## 2018-04-18 ASSESSMENT — PATIENT HEALTH QUESTIONNAIRE - PHQ9
CLINICAL INTERPRETATION OF PHQ2 SCORE: 4
5. POOR APPETITE OR OVEREATING: 3 - NEARLY EVERY DAY
SUM OF ALL RESPONSES TO PHQ QUESTIONS 1-9: 16

## 2018-04-18 NOTE — PATIENT INSTRUCTIONS
Hepatitis C  Hepatitis C is a viral infection of the liver. It can lead to scarring of the liver (cirrhosis), liver failure, or liver cancer. Hepatitis C may go undetected for months or years because people with the infection may not have symptoms, or they may have only mild symptoms.  What are the causes?  Hepatitis C is caused by the hepatitis C virus (HCV). The virus can be passed from one person to another through:  · Blood.  · Contaminated needles, such as those used for tattooing, body piercing, acupuncture, or injecting drugs.  · Having unprotected sex with an infected person.  · Childbirth.  · Blood transfusions or organ transplants done in the [place] before 1992.  What increases the risk?  Risk factors for hepatitis C include:  · Having unprotected sex with an infected person.  · Using illegal drugs.  What are the signs or symptoms?  Symptoms of hepatitis C may include:  · Fatigue.  · Loss of appetite.  · Nausea.  · Vomiting.  · Abdominal pain.  · Dark yellow urine.  · Yellowish skin and eyes (jaundice).  · Itching of the skin.  · Timoteo-colored bowel movements.  · Joint pain.  Symptoms are not always present.  How is this diagnosed?  Hepatitis C is diagnosed with blood tests. Other types of tests may also be done to check how your liver is functioning.  How is this treated?  Your health care provider may perform noninvasive tests or a liver biopsy to help determine the best course of treatment. Treatment for hepatitis C may include one or more medicines. Your health care provider may check you for a recurring infection or other liver conditions every 6-12 months after treatment.  Follow these instructions at home:  · Rest as needed.  · Take all medicines as directed by your health care provider.  · Do not take any medicine unless approved by your health care provider. This includes over-the-counter medicine and birth control pills.  · Do not drink alcohol.  · Do not have sex until approved by your  health care provider.  · Do not share toothbrushes, nail clippers, razors, or needles.  How is this prevented?  There is no vaccine for hepatitis C. The only way to prevent the disease is to reduce the risk of exposure to the virus. This may be done by:  · Practicing safe sex and using condoms.  · Avoiding illegal drugs.  Contact a health care provider if:  · You have a fever.  · You develop abdominal pain.  · You develop dark urine.  · You have dash-colored bowel movements.  · You develop joint pains.  Get help right away if:  · You have increasing fatigue or weakness.  · You lose your appetite.  · You feel nauseous or vomit.  · You develop jaundice or your jaundice gets worse.  · You bruise or bleed easily.  This information is not intended to replace advice given to you by your health care provider. Make sure you discuss any questions you have with your health care provider.  Document Released: 12/15/2001 Document Revised: 05/25/2017 Document Reviewed: 04/01/2015  [employer] Interactive Patient Education © 2017 [employer]

## 2018-04-18 NOTE — LETTER
[facility]  Raymond Avalos M.D.  [address]  [facility] [address]  Fax: [phone]   Authorization for Release/Disclosure of   Protected Health Information   Name: MAHAMED MAE : 1985 SSN: [id number]   Address: [address] [facility] [address] Phone:    [phone] (home)    I authorize the entity listed below to release/disclose the PHI below to:   [facility]/Raymond Avalos M.D. and Raymond Avalos M.D.   Provider or Entity Name:[facility]     Address   City, State, [facility]   Phone:      Fax:     Reason for request: continuity of care   Information to be released:    [  ] LAST COLONOSCOPY,  including any PATH REPORT and follow-up  [  ] LAST FIT/COLOGUARD RESULT [  ] LAST DEXA  [  ] LAST MAMMOGRAM  [  ] LAST PAP  [ X ] LAST LABS [  ] RETINA EXAM REPORT  [  ] IMMUNIZATION RECORDS  [ X ] Release all info      [  ] Check here and initial the line next to each item to release ALL health information INCLUDING  _____ Care and treatment for drug and / or alcohol abuse  _____ HIV testing, infection status, or AIDS  _____ Genetic Testing    DATES OF SERVICE OR TIME PERIOD TO BE DISCLOSED: _____________  I understand and acknowledge that:  * This Authorization may be revoked at any time by you in writing, except if your health information has already been used or disclosed.  * Your health information that will be used or disclosed as a result of you signing this authorization could be re-disclosed by the recipient. If this occurs, your re-disclosed health information may no longer be protected by State or Federal laws.  * You may refuse to sign this Authorization. Your refusal will not affect your ability to obtain treatment.  * This Authorization becomes effective upon signing and will  on (date) __________.      If no date is indicated, this Authorization will  one (1) year from the signature date.    Name: Mahamed Mae    Signature:   Date:     2018       PLEASE FAX REQUESTED  RECORDS BACK TO: [phone]

## 2018-04-18 NOTE — LETTER
[facility]  Raymond Avalos M.D.  [address] [facility] [address]  [facility] [address]  Fax: [phone]   Authorization for Release/Disclosure of   Protected Health Information   Name: MAHAMED MAE : 1985 SSN: [id number]   Address: [address] [place] [id number] Phone:    [phone] (home)    I authorize the entity listed below to release/disclose the PHI below to:   [facility]/Raymond Avalos M.D. and Raymond Avalos M.D.   Provider or Entity Name: [facility], [facility], [facility]   Phone:[phone]      Fax:     Reason for request: continuity of care   Information to be released:    [  ] LAST COLONOSCOPY,  including any PATH REPORT and follow-up  [  ] LAST FIT/COLOGUARD RESULT [  ] LAST DEXA  [  ] LAST MAMMOGRAM  [  ] LAST PAP  [ X ] LAST LABS [  ] RETINA EXAM REPORT  [  ] IMMUNIZATION RECORDS  [ X ] Release all info      [  ] Check here and initial the line next to each item to release ALL health information INCLUDING  _____ Care and treatment for drug and / or alcohol abuse  _____ HIV testing, infection status, or AIDS  _____ Genetic Testing    DATES OF SERVICE OR TIME PERIOD TO BE DISCLOSED: _____________  I understand and acknowledge that:  * This Authorization may be revoked at any time by you in writing, except if your health information has already been used or disclosed.  * Your health information that will be used or disclosed as a result of you signing this authorization could be re-disclosed by the recipient. If this occurs, your re-disclosed health information may no longer be protected by State or Federal laws.  * You may refuse to sign this Authorization. Your refusal will not affect your ability to obtain treatment.  * This Authorization becomes effective upon signing and will  on (date) __________.      If no date is indicated, this Authorization will  one (1) year from the signature date.    Name: Mahamed Mae    Signature:   Date:     2018       PLEASE FAX  REQUESTED RECORDS BACK TO: [phone]

## 2018-04-18 NOTE — TELEPHONE ENCOUNTER
DOCUMENTATION OF PAR STATUS:    1. Name of Medication & Dose: abilify 15 mg 1 tab po bid      2. Name of Prescription Coverage Company & phone #: medicaid HPN    3. Date Prior Auth Submitted: 04/18/18    4. What information was given to obtain insurance decision?     5. Prior Auth Letter Approved or Denied? Pending    6. Action Taken: Pharmacy/Patient Notified: No

## 2018-04-19 RX ORDER — ARIPIPRAZOLE 30 MG/1
30 TABLET ORAL DAILY
Qty: 30 TAB | Refills: 1 | Status: SHIPPED | OUTPATIENT
Start: 2018-04-19 | End: 2018-07-10

## 2018-04-19 NOTE — TELEPHONE ENCOUNTER
Abilify is a once daily drug with a long half life. Okay to do 30mg daily. Sent in the prescription. [facility] is unfortunately [facility] and cannot reach him via telephone to inform him.

## 2018-04-19 NOTE — PROGRESS NOTES
New Patient to Establish    Reason to establish: New patient to establish    CC: hepatitis C, depression, seizures    HPI: Mr. Mae is a pleasant 33 year old male with PMHx significant for depression, suicidal ideation, hepatitis C, substance use disorder presents to the clinic to establish care.    History was obtained from the patient and a medical record review.     He is currently living in [place] which is an inpatient rehab center for substance abuse and reports to be living there for the past 2 months. He has recently been incarcerated and released from senior living. He reports that he is doing fine and has been sober for the past 5 months. He has previously used heroin, IV meth, marijuana and also has a 20 pack year smoking history. He reports he quit and he has no intention of going back on them.    He also reports being diagnosed with hepatitis C about 2 years ago. He is genotype 1A and recent HCV RNA PCR. He reports never being treated for hepatitis C. He has never seen anyone about it.    He also reports severe history of depression and is currently on abilify and trazadone. His PHQ9 score is 14 but he reports that he is going to establish with a psychiatrist on May 9th. He currently denies suicidal and homicidal ideation.      Patient Active Problem List    Diagnosis Date Noted   • Seizures (CMS-HCC) 04/18/2018   • Depression 04/18/2018   • Hepatitis C 04/18/2018   • Bipolar 1 disorder (CMS-HCC) 04/18/2018   • Substance use disorder 04/18/2018       Past Medical History:   Diagnosis Date   • Psychiatric disorder     bipolar   • Seizure disorder (CMS-HCC)        Current Outpatient Prescriptions   Medication Sig Dispense Refill   • ARIPiprazole (ABILIFY) 15 MG Tab Take 1 Tab by mouth 2 Times a Day. 60 Tab 0   • levETIRAcetam (KEPPRA) 500 MG Tab Take 1 Tab by mouth 2 times a day. 60 Tab 0   • traZODone (DESYREL) 100 MG Tab Take 1 Tab by mouth every bedtime. 30 Tab 0     No current facility-administered  medications for this visit.        Allergies as of 04/18/2018 - Reviewed 04/18/2018   Allergen Reaction Noted   • Ativan Anxiety 06/21/2017   • Sulfa drugs Anaphylaxis and Hives 10/30/2013       Social History     Social History   • Marital status: Single     Spouse name: N/A   • Number of children: N/A   • Years of education: N/A     Occupational History   • Not on file.     Social History Main Topics   • Smoking status: Former Smoker     Packs/day: 1.00     Years: 20.00     Types: Cigarettes     Quit date: 1/25/2018   • Smokeless tobacco: Former User     Types: Chew     Quit date: 1/25/2018   • Alcohol use No   • Drug use: Yes     Types: Intravenous      Comment: Quit in December 2017, Used heroine and meth (IV)   • Sexual activity: Not on file     Other Topics Concern   • Not on file     Social History Narrative   • No narrative on file       Family History   Problem Relation Age of Onset   • Hypertension Mother    • Alcohol/Drug Father    • Diabetes Neg Hx    • Heart Disease Neg Hx    • Cancer Neg Hx    • Stroke Neg Hx        Past Surgical History:   Procedure Laterality Date   • HAND SURGERY     • HAND SURGERY     • OPEN REDUCTION         ROS: As per HPI. Additional pertinent symptoms as noted below.    Constitutional: Denies fevers, Denies weight changes  Eyes: Denies changes in vision, no eye pain  Ears/Nose/Throat/Mouth: Denies nasal congestion or sore throat   Cardiovascular: Denies chest pain or palpitations   Respiratory: Denies shortness of breath , Denies cough  Gastrointestinal/Hepatic: Denies abdominal pain, nausea, vomiting, diarrhea, constipation or GI bleeding   Genitourinary: Denies bladder dysfunction, dysuria or frequency  Musculoskeletal/Rheum: Denies  joint pain and swelling   Neurological: Denies headache, confusion, memory loss or focal weakness/parasthesias  Psychiatric: Reports mood disorder   Endocrine: denies hx of diabetes or thyroid dysfunction  Heme/Oncology/Lymph Nodes: Denies  "enlarged lymph nodes, denies brusing or known bleeding disorder  Allergic/Immunologic: Denies hx of allergies      All other systems were reviewed and are negative (AMA/CMS criteria)      /84   Pulse 82   Temp 36.6 °C (97.8 °F)   Ht 1.676 m (5' 6\")   Wt 81.6 kg (180 lb)   SpO2 97%   BMI 29.05 kg/m²     Physical Exam  General:  Alert and oriented, No apparent distress.    Eyes: Pupils equal and reactive. No scleral icterus.    Throat: Clear no erythema or exudates noted.    Neck: Supple. No lymphadenopathy noted. Thyroid not enlarged.    Lungs: Clear to auscultation and percussion bilaterally.    Cardiovascular: Regular rate and rhythm. No murmurs, rubs or gallops.    Abdomen:  Benign. No rebound or guarding noted.    Extremities: No clubbing, cyanosis, edema.    Skin: Clear. No rash or suspicious skin lesions noted.        Note: I have reviewed all pertinent labs and diagnostic tests associated with this visit with specific comments listed under the assessment and plan below    Assessment and Plan    1. Hepatitis C virus infection without hepatic coma, unspecified chronicity  HCV genotype 1A  RNA PCR on file.  Ultrasound of abdomen ordered.  HCV fibrosure ordered.  Referred to infectious disease for consideration of Harvoni.    2. Seizures (CMS-HCC)  Continue Keppra 500mg BID.  Sees neurologist at [place].  Strongly advised followup.  Refilled patient's Keppra, advised patient that further refills need to come from neurologist.  Does not drive and does not have a license.    3. Depression, unspecified depression type  Denies suicidal or homicidal ideation.  Continue Abilify and Trazadone.  Is going to establish with [facility] next month.    4. Bipolar 1 disorder (CMS-HCC)  Self reported history.  Recommend establishing with psychiatry.    5. Leukocytosis, unspecified type  Likely stress induced from ER visit.  Repeat CBC to ensure downtrend.    6. Substance use disorder  Strongly " counseled patient about ill effects of use.  Encouraged patient to stay off substances.  Patient is upbeat about being off and is positive he will remain sober.    7. Healthcare maintenance  Recommend getting Tdap vaccine.  Up to date on other vaccines.      Followup: Return in about 3 months (around 7/18/2018), or if symptoms worsen or fail to improve.      Signed by: Raymond Avalos M.D.

## 2018-04-20 NOTE — TELEPHONE ENCOUNTER
Attempted to call patient and unavailable to take call. Left a message asking for a return call in regards to a medication we sent to pharmacy

## 2018-04-24 ENCOUNTER — HOSPITAL ENCOUNTER (OUTPATIENT)
Dept: RADIOLOGY | Facility: MEDICAL CENTER | Age: 33
End: 2018-04-24
Attending: INTERNAL MEDICINE
Payer: MEDICAID

## 2018-04-24 DIAGNOSIS — B19.20 HEPATITIS C VIRUS INFECTION WITHOUT HEPATIC COMA, UNSPECIFIED CHRONICITY: ICD-10-CM

## 2018-04-24 PROCEDURE — 76700 US EXAM ABDOM COMPLETE: CPT

## 2018-04-27 ENCOUNTER — HOSPITAL ENCOUNTER (EMERGENCY)
Facility: MEDICAL CENTER | Age: 33
End: 2018-04-27
Attending: EMERGENCY MEDICINE
Payer: MEDICAID

## 2018-04-27 VITALS
HEART RATE: 80 BPM | RESPIRATION RATE: 16 BRPM | OXYGEN SATURATION: 98 % | HEIGHT: 66 IN | BODY MASS INDEX: 28.93 KG/M2 | TEMPERATURE: 98.8 F | DIASTOLIC BLOOD PRESSURE: 71 MMHG | WEIGHT: 180 LBS | SYSTOLIC BLOOD PRESSURE: 118 MMHG

## 2018-04-27 DIAGNOSIS — D72.829 LEUKOCYTOSIS, UNSPECIFIED TYPE: ICD-10-CM

## 2018-04-27 DIAGNOSIS — R56.9 SEIZURE (HCC): ICD-10-CM

## 2018-04-27 DIAGNOSIS — B19.20 HEPATITIS C VIRUS INFECTION WITHOUT HEPATIC COMA, UNSPECIFIED CHRONICITY: ICD-10-CM

## 2018-04-27 LAB
ANION GAP SERPL CALC-SCNC: 8 MMOL/L (ref 0–11.9)
BUN SERPL-MCNC: 12 MG/DL (ref 8–22)
CALCIUM SERPL-MCNC: 9.3 MG/DL (ref 8.5–10.5)
CHLORIDE SERPL-SCNC: 101 MMOL/L (ref 96–112)
CO2 SERPL-SCNC: 24 MMOL/L (ref 20–33)
CREAT SERPL-MCNC: 0.79 MG/DL (ref 0.5–1.4)
GLUCOSE SERPL-MCNC: 81 MG/DL (ref 65–99)
POTASSIUM SERPL-SCNC: 4 MMOL/L (ref 3.6–5.5)
SODIUM SERPL-SCNC: 133 MMOL/L (ref 135–145)

## 2018-04-27 PROCEDURE — 96375 TX/PRO/DX INJ NEW DRUG ADDON: CPT

## 2018-04-27 PROCEDURE — 80048 BASIC METABOLIC PNL TOTAL CA: CPT

## 2018-04-27 PROCEDURE — 700111 HCHG RX REV CODE 636 W/ 250 OVERRIDE (IP): Performed by: EMERGENCY MEDICINE

## 2018-04-27 PROCEDURE — 96374 THER/PROPH/DIAG INJ IV PUSH: CPT

## 2018-04-27 PROCEDURE — 700105 HCHG RX REV CODE 258: Performed by: EMERGENCY MEDICINE

## 2018-04-27 PROCEDURE — A9270 NON-COVERED ITEM OR SERVICE: HCPCS | Performed by: EMERGENCY MEDICINE

## 2018-04-27 PROCEDURE — 99285 EMERGENCY DEPT VISIT HI MDM: CPT

## 2018-04-27 PROCEDURE — 700102 HCHG RX REV CODE 250 W/ 637 OVERRIDE(OP): Performed by: EMERGENCY MEDICINE

## 2018-04-27 RX ORDER — KETOROLAC TROMETHAMINE 30 MG/ML
30 INJECTION, SOLUTION INTRAMUSCULAR; INTRAVENOUS ONCE
Status: COMPLETED | OUTPATIENT
Start: 2018-04-27 | End: 2018-04-27

## 2018-04-27 RX ORDER — QUETIAPINE FUMARATE 100 MG/1
100 TABLET, FILM COATED ORAL ONCE
Status: COMPLETED | OUTPATIENT
Start: 2018-04-27 | End: 2018-04-27

## 2018-04-27 RX ORDER — QUETIAPINE FUMARATE 100 MG/1
100 TABLET, FILM COATED ORAL
Qty: 20 TAB | Refills: 0 | Status: SHIPPED | OUTPATIENT
Start: 2018-04-27 | End: 2018-07-10

## 2018-04-27 RX ORDER — LEVETIRACETAM 500 MG/1
500 TABLET ORAL 2 TIMES DAILY
Qty: 60 TAB | Refills: 0 | Status: SHIPPED | OUTPATIENT
Start: 2018-04-27 | End: 2018-05-01

## 2018-04-27 RX ADMIN — SODIUM CHLORIDE 500 MG: 9 INJECTION, SOLUTION INTRAVENOUS at 17:14

## 2018-04-27 RX ADMIN — QUETIAPINE FUMARATE 100 MG: 100 TABLET ORAL at 17:25

## 2018-04-27 RX ADMIN — KETOROLAC TROMETHAMINE 30 MG: 30 INJECTION, SOLUTION INTRAMUSCULAR at 16:55

## 2018-04-27 ASSESSMENT — PAIN SCALES - GENERAL
PAINLEVEL_OUTOF10: 5
PAINLEVEL_OUTOF10: 5

## 2018-04-27 NOTE — ED TRIAGE NOTES
Pt BIB EMS from [facility]  Witnessed seizure prior to EMS arrival and witnessed generalized tonic clonic seizure with EMS  Pt given 2 mg versed by EMS PTA  Hx of seizures taking keppra as prescribed
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
## 2018-04-27 NOTE — DISCHARGE INSTRUCTIONS
Seizure, Adult  When you have a seizure:  · Parts of your body may move.  · How aware or awake (conscious) you are may change.  · You may shake (convulse).  Some people have symptoms right before a seizure happens. These symptoms may include:  · Fear.  · Worry (anxiety).  · Feeling like you are going to throw up (nausea).  · Feeling like the room is spinning (vertigo).  · Feeling like you saw or heard something before (meagan vu).  · Odd tastes or smells.  · Changes in vision, such as seeing flashing lights or spots.  Seizures usually last from 30 seconds to 2 minutes. Usually, they are not harmful unless they last a long time.  Follow these instructions at home:  Medicines  · Take over-the-counter and prescription medicines only as told by your doctor.  · Avoid anything that may keep your medicine from working, such as alcohol.  Activity  · Do not do any activities that would be dangerous if you had another seizure, like driving or swimming. Wait until your doctor approves.  · If you live in the [place] ask your local DMV (department of [employer]) when you can drive.  · Rest.  Teaching others  · Teach friends and family what to do when you have a seizure. They should:  ¨ Lay you on the ground.  ¨ Protect your head and body.  ¨ Loosen any tight clothing around your neck.  ¨ Turn you on your side.  ¨ Stay with you until you are better.  ¨ Not hold you down.  ¨ Not put anything in your mouth.  ¨ Know whether or not you need emergency care.  General instructions  · Contact your doctor each time you have a seizure.  · Avoid anything that gives you seizures.  · Keep a seizure diary. Write down:  ¨ What you think caused each seizure.  ¨ What you remember about each seizure.  · Keep all follow-up visits as told by your doctor. This is important.  Contact a doctor if:  · You have another seizure.  · You have seizures more often.  · There is any change in what happens during your seizures.  · You continue to have seizures  with treatment.  · You have symptoms of being sick or having an infection.  Get help right away if:  · You have a seizure:  ¨ That lasts longer than 5 minutes.  ¨ That is different than seizures you had before.  ¨ That makes it harder to breathe.  ¨ After you hurt your head.  · After a seizure, you cannot speak or use a part of your body.  · After a seizure, you are confused or have a bad headache.  · You have two or more seizures in a row.  · You are having seizures more often.  · You do not wake up right after a seizure.  · You get hurt during a seizure.  In an emergency:  · These symptoms may be an emergency. Do not wait to see if the symptoms will go away. Get medical help right away. Call your local emergency services (911 in the [place] Do not drive yourself to the hospital.  This information is not intended to replace advice given to you by your health care provider. Make sure you discuss any questions you have with your health care provider.  Document Released: 06/05/2009 Document Revised: 08/30/2017 Document Reviewed: 08/30/2017  [employer] Interactive Patient Education © 2017 [employer]

## 2018-04-28 NOTE — ED NOTES
Pt discharged, reviewed all discharge instructions including medications and follow up, pt verbalizes understanding, and denies questions. Prescriptions given to pt.  Escorted to lobby with guest.

## 2018-05-01 ENCOUNTER — OFFICE VISIT (OUTPATIENT)
Dept: INTERNAL MEDICINE | Facility: MEDICAL CENTER | Age: 33
End: 2018-05-01
Payer: MEDICAID

## 2018-05-01 VITALS
BODY MASS INDEX: 29.51 KG/M2 | DIASTOLIC BLOOD PRESSURE: 60 MMHG | HEART RATE: 64 BPM | OXYGEN SATURATION: 97 % | WEIGHT: 183.6 LBS | HEIGHT: 66 IN | TEMPERATURE: 98.9 F | SYSTOLIC BLOOD PRESSURE: 118 MMHG

## 2018-05-01 DIAGNOSIS — R56.9 SEIZURES (HCC): ICD-10-CM

## 2018-05-01 DIAGNOSIS — F32.A DEPRESSION, UNSPECIFIED DEPRESSION TYPE: ICD-10-CM

## 2018-05-01 DIAGNOSIS — B19.20 HEPATITIS C VIRUS INFECTION WITHOUT HEPATIC COMA, UNSPECIFIED CHRONICITY: ICD-10-CM

## 2018-05-01 DIAGNOSIS — F19.90 SUBSTANCE USE DISORDER: ICD-10-CM

## 2018-05-01 PROCEDURE — 99214 OFFICE O/P EST MOD 30 MIN: CPT | Mod: GC | Performed by: INTERNAL MEDICINE

## 2018-05-01 RX ORDER — LEVETIRACETAM 500 MG/1
1000 TABLET ORAL 2 TIMES DAILY
Qty: 60 TAB | Refills: 0 | Status: SHIPPED | OUTPATIENT
Start: 2018-05-01 | End: 2018-07-25

## 2018-05-01 ASSESSMENT — PATIENT HEALTH QUESTIONNAIRE - PHQ9: CLINICAL INTERPRETATION OF PHQ2 SCORE: 0

## 2018-05-01 NOTE — PATIENT INSTRUCTIONS
Take medications as prescribed   Follow up with Psychiatrist and neurology as scheduled   Will get you schedule with Infectious disease for evaluation   Stay hydrated, eat a healthy diet and exercise at least 5 x weekly.       Seizure, Adult  When you have a seizure:  · Parts of your body may move.  · How aware or awake (conscious) you are may change.  · You may shake (convulse).  Some people have symptoms right before a seizure happens. These symptoms may include:  · Fear.  · Worry (anxiety).  · Feeling like you are going to throw up (nausea).  · Feeling like the room is spinning (vertigo).  · Feeling like you saw or heard something before (meagan vu).  · Odd tastes or smells.  · Changes in vision, such as seeing flashing lights or spots.  Seizures usually last from 30 seconds to 2 minutes. Usually, they are not harmful unless they last a long time.  Follow these instructions at home:  Medicines  · Take over-the-counter and prescription medicines only as told by your doctor.  · Avoid anything that may keep your medicine from working, such as alcohol.  Activity  · Do not do any activities that would be dangerous if you had another seizure, like driving or swimming. Wait until your doctor approves.  · If you live in the [place] ask your local DMV (department of [employer]) when you can drive.  · Rest.  Teaching others  · Teach friends and family what to do when you have a seizure. They should:  ¨ Lay you on the ground.  ¨ Protect your head and body.  ¨ Loosen any tight clothing around your neck.  ¨ Turn you on your side.  ¨ Stay with you until you are better.  ¨ Not hold you down.  ¨ Not put anything in your mouth.  ¨ Know whether or not you need emergency care.  General instructions  · Contact your doctor each time you have a seizure.  · Avoid anything that gives you seizures.  · Keep a seizure diary. Write down:  ¨ What you think caused each seizure.  ¨ What you remember about each seizure.  · Keep all follow-up  visits as told by your doctor. This is important.  Contact a doctor if:  · You have another seizure.  · You have seizures more often.  · There is any change in what happens during your seizures.  · You continue to have seizures with treatment.  · You have symptoms of being sick or having an infection.  Get help right away if:  · You have a seizure:  ¨ That lasts longer than 5 minutes.  ¨ That is different than seizures you had before.  ¨ That makes it harder to breathe.  ¨ After you hurt your head.  · After a seizure, you cannot speak or use a part of your body.  · After a seizure, you are confused or have a bad headache.  · You have two or more seizures in a row.  · You are having seizures more often.  · You do not wake up right after a seizure.  · You get hurt during a seizure.  In an emergency:  · These symptoms may be an emergency. Do not wait to see if the symptoms will go away. Get medical help right away. Call your local emergency services (911 in the [place] Do not drive yourself to the hospital.  This information is not intended to replace advice given to you by your health care provider. Make sure you discuss any questions you have with your health care provider.  Document Released: 06/05/2009 Document Revised: 08/30/2017 Document Reviewed: 08/30/2017  Elsevier Interactive Patient Education © 2017 [employer] Inc[employer]

## 2018-05-01 NOTE — PROGRESS NOTES
Established Patient    Mahamed presents today with the following:    CC: post hospital follow up for seizure     HPI:   Mr. Mae is a very pleasant 32 yo male with PMHx of substance abuse, depression, Bipolar disorder type 1, hepatitis C and seizure disorder presents for post hospital follow up. He was seen in the [facility] on 4/27 after having a witnessed seizure, this was thought to be secondary to taking trazodone. This medication was stopped and his keppra dose was increased to 1000 mg BID. He has not experienced any seizures since and he states he feels good and has no side effects from the increased Keppra dose. He continues to take his Seroquel and denies any current depression, SI or HI.   He has follow up with Psych at anshu visit on the 9th. He also has follow up scheduled with neurology in Aug. In the mean time we will continue to manage with Keppra.   He completed the requested labs for his Hepatitis C, we will get him scheduled with ID for possible treatment.   He denies any headache, chest pain, SOB, abdominal pain or other complaints, at this time.     Patient Active Problem List    Diagnosis Date Noted   • Seizures (HCC) 04/18/2018   • Depression 04/18/2018   • Hepatitis C 04/18/2018   • Bipolar 1 disorder (HCC) 04/18/2018   • Substance use disorder 04/18/2018       Current Outpatient Prescriptions   Medication Sig Dispense Refill   • QUEtiapine (SEROQUEL) 100 MG Tab Take 1 Tab by mouth every bedtime. 20 Tab 0   • aripiprazole (ABILIFY) 30 MG tablet Take 1 Tab by mouth every day. 30 Tab 1   • levETIRAcetam (KEPPRA) 500 MG Tab Take 1 Tab by mouth 2 times a day. 60 Tab 0   • levETIRAcetam (KEPPRA) 500 MG Tab Take 1 Tab by mouth 2 times a day. 60 Tab 0   • traZODone (DESYREL) 100 MG Tab Take 1 Tab by mouth every bedtime. 30 Tab 0     No current facility-administered medications for this visit.        ROS: As per HPI. Additional pertinent symptoms as noted below.    Constitutional: denies fever, chills or  "night sweats  Eyes: denies blurry or double vision, wears glasses  ENT: denies sore throat or congestion   Cardiovascular: denies chest pain or palpitaitons   Respiratory: denies SOB or cough  GI: denies abdominal pain, N/V, D/C  : denies dysuria or urinary frequency   Musculo-skeletal: denies muscle or joint pain or swelling  Neurological: denies headaches, numbness or tingling   Psychological: denies current depression, SI/HI    /60   Pulse 64   Temp 37.2 °C (98.9 °F)   Ht 1.676 m (5' 6\")   Wt 83.3 kg (183 lb 9.6 oz)   SpO2 97%   BMI 29.63 kg/m²     Physical Exam   Constitutional:  Well appearing male in no acute distress   Eyes: Pupils are equal, round, and reactive to light. No scleral icterus. Wearing corrective lenses   Neck: Neck supple. No thyromegaly present. no cervical adenopathy  Cardiovascular: Normal rate, regular rhythm and normal heart sounds.  Exam reveals no gallop and no friction rub.  No murmur heard.  Pulmonary/Chest: Breath sounds normal. Chest wall is not dull to percussion.   Abdomen: soft, non-tender, BS normoactive  Musculoskeletal:   no edema or joint swelling  Neurological: alert and oriented to person, place, and time. Strength equal bilaterally   Skin: No cyanosis. Nails show no clubbing.  Psych: flat affect, judgement and insight appropriate       Assessment and Plan    1. Depression  - currently taking Seroquel with good control   - has follow up with psychiatry on May 9th at [facility]   - denies SI/HI     2. Hepatitis C infection   - HCV genotype 1A  - RNA PCR and HCV firbosure on file in media   - US of liver is unremarkable   - will schedule appointment with ID for possible treatment with [facility]     3. Seizure disorder   - hx of seizure disorder for >10 years   - most recent seizure on 4/27, thought to be secondary to trazodone   - trazodone d/c, increased keppra to 1000 mg BID   - continue Keppra  - follow up with neurology in August   - educated on proper " hydration and sleep   - does not drive     4. Substance use disorder   - hx of substance use, currently living in sober living home and doing well  - sober since Jan 2018  - educated on importance of continued cessation     Followup: Return in about 3 months (around 8/1/2018) for [facility].      Signed by: Damari Jackson M.D.

## 2018-05-03 ENCOUNTER — TELEPHONE (OUTPATIENT)
Dept: INTERNAL MEDICINE | Facility: MEDICAL CENTER | Age: 33
End: 2018-05-03

## 2018-05-03 NOTE — TELEPHONE ENCOUNTER
REFERRAL: [facility]   Pt's# [phone]  Called pt and l/m. Asking pt to return my call to schedule an appt with Dr Landis.

## 2018-05-09 NOTE — TELEPHONE ENCOUNTER
1. Caller Name: Mahamed                      Call Back Number: [phone]    2. Message: Pt called and l/m. Pt would like to set up an appt with Dr Landis.     3. Patient approves office to leave a detailed voicemail/MyChart message: N\A    Called pt and l/m. That I was returning his call and asked for him to return my call.

## 2018-05-10 ENCOUNTER — APPOINTMENT (OUTPATIENT)
Dept: RADIOLOGY | Facility: MEDICAL CENTER | Age: 33
End: 2018-05-10
Attending: EMERGENCY MEDICINE
Payer: MEDICAID

## 2018-05-10 ENCOUNTER — HOSPITAL ENCOUNTER (EMERGENCY)
Facility: MEDICAL CENTER | Age: 33
End: 2018-05-10
Attending: EMERGENCY MEDICINE
Payer: MEDICAID

## 2018-05-10 VITALS
SYSTOLIC BLOOD PRESSURE: 141 MMHG | DIASTOLIC BLOOD PRESSURE: 76 MMHG | OXYGEN SATURATION: 98 % | BODY MASS INDEX: 29.55 KG/M2 | HEIGHT: 66 IN | WEIGHT: 183.86 LBS | HEART RATE: 107 BPM | TEMPERATURE: 97.7 F | RESPIRATION RATE: 18 BRPM

## 2018-05-10 DIAGNOSIS — M25.511 ACUTE PAIN OF RIGHT SHOULDER: ICD-10-CM

## 2018-05-10 DIAGNOSIS — R56.9 SEIZURE (HCC): ICD-10-CM

## 2018-05-10 LAB
ANION GAP SERPL CALC-SCNC: 10 MMOL/L (ref 0–11.9)
BASOPHILS # BLD AUTO: 0.8 % (ref 0–1.8)
BASOPHILS # BLD: 0.08 K/UL (ref 0–0.12)
BUN SERPL-MCNC: 15 MG/DL (ref 8–22)
CALCIUM SERPL-MCNC: 9.5 MG/DL (ref 8.5–10.5)
CHLORIDE SERPL-SCNC: 106 MMOL/L (ref 96–112)
CO2 SERPL-SCNC: 23 MMOL/L (ref 20–33)
CREAT SERPL-MCNC: 0.9 MG/DL (ref 0.5–1.4)
EOSINOPHIL # BLD AUTO: 0.21 K/UL (ref 0–0.51)
EOSINOPHIL NFR BLD: 2 % (ref 0–6.9)
ERYTHROCYTE [DISTWIDTH] IN BLOOD BY AUTOMATED COUNT: 42.8 FL (ref 35.9–50)
GLUCOSE SERPL-MCNC: 84 MG/DL (ref 65–99)
HCT VFR BLD AUTO: 49.9 % (ref 42–52)
HGB BLD-MCNC: 17 G/DL (ref 14–18)
IMM GRANULOCYTES # BLD AUTO: 0.08 K/UL (ref 0–0.11)
IMM GRANULOCYTES NFR BLD AUTO: 0.8 % (ref 0–0.9)
LYMPHOCYTES # BLD AUTO: 4.45 K/UL (ref 1–4.8)
LYMPHOCYTES NFR BLD: 42.8 % (ref 22–41)
MCH RBC QN AUTO: 30.4 PG (ref 27–33)
MCHC RBC AUTO-ENTMCNC: 34.1 G/DL (ref 33.7–35.3)
MCV RBC AUTO: 89.1 FL (ref 81.4–97.8)
MONOCYTES # BLD AUTO: 1.2 K/UL (ref 0–0.85)
MONOCYTES NFR BLD AUTO: 11.5 % (ref 0–13.4)
NEUTROPHILS # BLD AUTO: 4.37 K/UL (ref 1.82–7.42)
NEUTROPHILS NFR BLD: 42.1 % (ref 44–72)
NRBC # BLD AUTO: 0 K/UL
NRBC BLD-RTO: 0 /100 WBC
PLATELET # BLD AUTO: 289 K/UL (ref 164–446)
PMV BLD AUTO: 9.3 FL (ref 9–12.9)
POTASSIUM SERPL-SCNC: 3.7 MMOL/L (ref 3.6–5.5)
RBC # BLD AUTO: 5.6 M/UL (ref 4.7–6.1)
SODIUM SERPL-SCNC: 139 MMOL/L (ref 135–145)
WBC # BLD AUTO: 10.4 K/UL (ref 4.8–10.8)

## 2018-05-10 PROCEDURE — 85025 COMPLETE CBC W/AUTO DIFF WBC: CPT

## 2018-05-10 PROCEDURE — 80048 BASIC METABOLIC PNL TOTAL CA: CPT

## 2018-05-10 PROCEDURE — 99284 EMERGENCY DEPT VISIT MOD MDM: CPT

## 2018-05-10 PROCEDURE — 73030 X-RAY EXAM OF SHOULDER: CPT | Mod: RT

## 2018-05-10 PROCEDURE — 96374 THER/PROPH/DIAG INJ IV PUSH: CPT

## 2018-05-10 PROCEDURE — 700111 HCHG RX REV CODE 636 W/ 250 OVERRIDE (IP): Performed by: EMERGENCY MEDICINE

## 2018-05-10 PROCEDURE — 700105 HCHG RX REV CODE 258: Performed by: EMERGENCY MEDICINE

## 2018-05-10 RX ADMIN — SODIUM CHLORIDE 1000 MG: 9 INJECTION, SOLUTION INTRAVENOUS at 01:23

## 2018-05-10 NOTE — ED TRIAGE NOTES
"Chief Complaint   Patient presents with   • Seizure     Patient ambulatory to triage. States that he has had multiple seizures today. Patient states that this happens often. Patient also c/o RIGHT arm stiffness/pain. Patient reports that he is complaint with his keppra, and that his PCP recently \"doubled my dose\". /76   Pulse (!) 107   Temp 36.5 °C (97.7 °F)   Resp 18   Ht 1.676 m (5' 6\")   Wt 83.4 kg (183 lb 13.8 oz)   SpO2 98%   BMI 29.68 kg/m²     "

## 2018-05-10 NOTE — ED NOTES
Pt discharged to home  Pt understands follow up care and discharge instructions    Offers no complaints at this time   Discharged with self/family to lobby
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
## 2018-05-10 NOTE — DISCHARGE INSTRUCTIONS
Seizure, Adult  When you have a seizure:  · Parts of your body may move.  · How aware or awake (conscious) you are may change.  · You may shake (convulse).  Some people have symptoms right before a seizure happens. These symptoms may include:  · Fear.  · Worry (anxiety).  · Feeling like you are going to throw up (nausea).  · Feeling like the room is spinning (vertigo).  · Feeling like you saw or heard something before (meagan vu).  · Odd tastes or smells.  · Changes in vision, such as seeing flashing lights or spots.  Seizures usually last from 30 seconds to 2 minutes. Usually, they are not harmful unless they last a long time.  Follow these instructions at home:  Medicines  · Take over-the-counter and prescription medicines only as told by your doctor.  · Avoid anything that may keep your medicine from working, such as alcohol.  Activity  · Do not do any activities that would be dangerous if you had another seizure, like driving or swimming. Wait until your doctor approves.  · If you live in the [place] ask your local DMV (department of [employer]) when you can drive.  · Rest.  Teaching others  · Teach friends and family what to do when you have a seizure. They should:  ¨ Lay you on the ground.  ¨ Protect your head and body.  ¨ Loosen any tight clothing around your neck.  ¨ Turn you on your side.  ¨ Stay with you until you are better.  ¨ Not hold you down.  ¨ Not put anything in your mouth.  ¨ Know whether or not you need emergency care.  General instructions  · Contact your doctor each time you have a seizure.  · Avoid anything that gives you seizures.  · Keep a seizure diary. Write down:  ¨ What you think caused each seizure.  ¨ What you remember about each seizure.  · Keep all follow-up visits as told by your doctor. This is important.  Contact a doctor if:  · You have another seizure.  · You have seizures more often.  · There is any change in what happens during your seizures.  · You continue to have seizures  with treatment.  · You have symptoms of being sick or having an infection.  Get help right away if:  · You have a seizure:  ¨ That lasts longer than 5 minutes.  ¨ That is different than seizures you had before.  ¨ That makes it harder to breathe.  ¨ After you hurt your head.  · After a seizure, you cannot speak or use a part of your body.  · After a seizure, you are confused or have a bad headache.  · You have two or more seizures in a row.  · You are having seizures more often.  · You do not wake up right after a seizure.  · You get hurt during a seizure.  In an emergency:  · These symptoms may be an emergency. Do not wait to see if the symptoms will go away. Get medical help right away. Call your local emergency services (911 in the [place] Do not drive yourself to the hospital.  This information is not intended to replace advice given to you by your health care provider. Make sure you discuss any questions you have with your health care provider.  Document Released: 06/05/2009 Document Revised: 08/30/2017 Document Reviewed: 08/30/2017  [employer] Interactive Patient Education © 2017 [employer]

## 2018-05-10 NOTE — TELEPHONE ENCOUNTER
Marie from [employer] called back and l/m. Her and the pt was returning my call and listened to the times/dates that are available to see Dr Landis. Pt said 05/14/18 at 10:20am. Would work good.    Scheduled pt 05/14/18 at 10:20am    Called and spoke with pt. Confirmed this appt with pt.

## 2018-05-14 ENCOUNTER — APPOINTMENT (OUTPATIENT)
Dept: INTERNAL MEDICINE | Facility: MEDICAL CENTER | Age: 33
End: 2018-05-14
Payer: MEDICAID

## 2018-05-21 ENCOUNTER — OFFICE VISIT (OUTPATIENT)
Dept: INTERNAL MEDICINE | Facility: MEDICAL CENTER | Age: 33
End: 2018-05-21
Payer: MEDICAID

## 2018-05-21 VITALS
HEART RATE: 66 BPM | OXYGEN SATURATION: 97 % | HEIGHT: 66 IN | SYSTOLIC BLOOD PRESSURE: 100 MMHG | WEIGHT: 182.6 LBS | TEMPERATURE: 97.4 F | BODY MASS INDEX: 29.35 KG/M2 | DIASTOLIC BLOOD PRESSURE: 82 MMHG

## 2018-05-21 DIAGNOSIS — B18.2 CHRONIC HEPATITIS C WITHOUT HEPATIC COMA (HCC): ICD-10-CM

## 2018-05-21 DIAGNOSIS — F19.90 SUBSTANCE USE DISORDER: ICD-10-CM

## 2018-05-21 PROCEDURE — 99203 OFFICE O/P NEW LOW 30 MIN: CPT | Performed by: INTERNAL MEDICINE

## 2018-05-21 RX ORDER — VELPATASVIR AND SOFOSBUVIR 100; 400 MG/1; MG/1
TABLET, FILM COATED ORAL
COMMUNITY
End: 2018-05-21 | Stop reason: SDUPTHER

## 2018-05-21 RX ORDER — VELPATASVIR AND SOFOSBUVIR 100; 400 MG/1; MG/1
400 TABLET, FILM COATED ORAL DAILY
Qty: 28 TAB | Refills: 2 | Status: SHIPPED | OUTPATIENT
Start: 2018-05-21 | End: 2018-05-24 | Stop reason: CLARIF

## 2018-05-21 RX ORDER — NALTREXONE HYDROCHLORIDE 50 MG/1
50 TABLET, FILM COATED ORAL DAILY
Status: CANCELLED | OUTPATIENT
Start: 2018-05-21

## 2018-05-21 ASSESSMENT — ENCOUNTER SYMPTOMS
CONSTITUTIONAL NEGATIVE: 1
PSYCHIATRIC NEGATIVE: 1
CARDIOVASCULAR NEGATIVE: 1
MUSCULOSKELETAL NEGATIVE: 1
RESPIRATORY NEGATIVE: 1

## 2018-05-22 ENCOUNTER — TELEPHONE (OUTPATIENT)
Dept: INTERNAL MEDICINE | Facility: MEDICAL CENTER | Age: 33
End: 2018-05-22

## 2018-05-22 DIAGNOSIS — B18.2 CHRONIC HEPATITIS C WITHOUT HEPATIC COMA (HCC): ICD-10-CM

## 2018-05-22 NOTE — TELEPHONE ENCOUNTER
1. Caller Name: Fax: [facility]                       Call Back Number: [facility]-Insurance    2. Message: Received a fax from pt's insurance. Epclusa was not approved. Pt has to try Mavyret first. Pleas e resend rx.    3. Patient approves office to leave a detailed voicemail/MyChart message: N\A

## 2018-05-25 NOTE — TELEPHONE ENCOUNTER
Received a fax from pt's insurance, [employer] Approved 84 tablets through 07/19/18.    Pt is going to received medication from [facility] on 05/30/18.      Called and spoke with pt. Scheduled pt to see Dr Landis on 05/31/18 at 1pm.(Okayed per Dr Landis)

## 2018-05-31 ENCOUNTER — OFFICE VISIT (OUTPATIENT)
Dept: INTERNAL MEDICINE | Facility: MEDICAL CENTER | Age: 33
End: 2018-05-31
Payer: MEDICAID

## 2018-05-31 VITALS
SYSTOLIC BLOOD PRESSURE: 100 MMHG | DIASTOLIC BLOOD PRESSURE: 70 MMHG | OXYGEN SATURATION: 96 % | WEIGHT: 187.38 LBS | TEMPERATURE: 97.4 F | HEART RATE: 77 BPM | HEIGHT: 66 IN | BODY MASS INDEX: 30.11 KG/M2

## 2018-05-31 DIAGNOSIS — B18.2 CHRONIC HEPATITIS C WITHOUT HEPATIC COMA (HCC): ICD-10-CM

## 2018-05-31 PROCEDURE — 99212 OFFICE O/P EST SF 10 MIN: CPT | Performed by: INTERNAL MEDICINE

## 2018-05-31 ASSESSMENT — ENCOUNTER SYMPTOMS
PSYCHIATRIC NEGATIVE: 1
MUSCULOSKELETAL NEGATIVE: 1
CONSTITUTIONAL NEGATIVE: 1
RESPIRATORY NEGATIVE: 1
CARDIOVASCULAR NEGATIVE: 1

## 2018-06-15 ENCOUNTER — TELEPHONE (OUTPATIENT)
Dept: INTERNAL MEDICINE | Facility: MEDICAL CENTER | Age: 33
End: 2018-06-15

## 2018-06-15 NOTE — TELEPHONE ENCOUNTER
1. Caller Name: Mahamed                      Call Back Number: [phone] (home)       2. Message: Mahamed called and l/m. Wanted to notify him of his new number. It is #[phone]. Also would like for us to all the specialty pharmacy and notified them of this as well.    3. Patient approves office to leave a detailed voicemail/MyChart message: N\A    Changed pt's phone number in chart.    Called [facility] [place] specialty pharmacy and notified them of this.

## 2018-06-18 ENCOUNTER — NON-PROVIDER VISIT (OUTPATIENT)
Dept: URGENT CARE | Facility: PHYSICIAN GROUP | Age: 33
End: 2018-06-18

## 2018-06-18 DIAGNOSIS — Z02.1 PRE-EMPLOYMENT DRUG SCREENING: ICD-10-CM

## 2018-06-18 LAB
AMP AMPHETAMINE: NORMAL
BAR BARBITURATES: NORMAL
BZO BENZODIAZEPINES: NORMAL
COC COCAINE: NORMAL
INT CON NEG: NORMAL
INT CON POS: NORMAL
MDMA ECSTASY: NORMAL
MET METHAMPHETAMINES: NORMAL
MTD METHADONE: NORMAL
OPI OPIATES: NORMAL
OXY OXYCODONE: NORMAL
PCP PHENCYCLIDINE: NORMAL
POC URINE DRUG SCREEN OCDRS: NEGATIVE
THC: NORMAL

## 2018-06-18 PROCEDURE — 80305 DRUG TEST PRSMV DIR OPT OBS: CPT | Performed by: PHYSICIAN ASSISTANT

## 2018-06-23 ENCOUNTER — HOSPITAL ENCOUNTER (EMERGENCY)
Facility: MEDICAL CENTER | Age: 33
End: 2018-06-23
Attending: EMERGENCY MEDICINE
Payer: MEDICAID

## 2018-06-23 VITALS
SYSTOLIC BLOOD PRESSURE: 122 MMHG | DIASTOLIC BLOOD PRESSURE: 86 MMHG | RESPIRATION RATE: 14 BRPM | WEIGHT: 187.39 LBS | OXYGEN SATURATION: 97 % | HEART RATE: 82 BPM | TEMPERATURE: 97.5 F | BODY MASS INDEX: 30.12 KG/M2 | HEIGHT: 66 IN

## 2018-06-23 DIAGNOSIS — I80.9 THROMBOPHLEBITIS: ICD-10-CM

## 2018-06-23 PROCEDURE — 99283 EMERGENCY DEPT VISIT LOW MDM: CPT

## 2018-06-23 PROCEDURE — 93970 EXTREMITY STUDY: CPT

## 2018-06-23 RX ORDER — NAPROXEN 500 MG/1
500 TABLET ORAL 2 TIMES DAILY WITH MEALS
Qty: 20 TAB | Refills: 0 | Status: SHIPPED | OUTPATIENT
Start: 2018-06-23 | End: 2018-08-27

## 2018-06-23 ASSESSMENT — PAIN SCALES - GENERAL: PAINLEVEL_OUTOF10: 8

## 2018-06-24 NOTE — ED NOTES
Patient discharged with instructions for Phlebitis with prescriptions x1 signs and symptoms explained to patient for reasons to return to emergency department, Patient is understanding and has no further questions. Pt to  prescription at the [facility] on [facility] [place]. Pt is understanding and ambulated to lobby with a steady gait.

## 2018-06-24 NOTE — ED TRIAGE NOTES
"32 y/o male ambulatory to triage with c/o possible abscess to his right arm. Pt states he noticed hard lumps in his \"veins\" a few days ago. He has a large lump on his right forearm which is swollen and painful to touch. Pt admits to previous IV drug use but states he has been clean x 6 months.   "
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
## 2018-06-24 NOTE — DISCHARGE INSTRUCTIONS
Phlebitis  Phlebitis is soreness and swelling (inflammation) of a vein. This can occur in your arms, legs, or torso (trunk), as well as deeper inside your body. Phlebitis is usually not serious when it occurs close to the surface of the body. However, it can cause serious problems when it occurs in a vein deeper inside the body.  What are the causes?  Phlebitis can be triggered by various things, including:  · Reduced blood flow through your veins. This can happen with:  ¨ Bed rest over a long period.  ¨ Long-distance travel.  ¨ Injury.  ¨ Surgery.  ¨ Being overweight (obese) or pregnant.  · Having an IV tube put in the vein and getting certain medicines through the vein.  · Cancer and cancer treatment.  · Use of illegal drugs taken through the vein.  · Inflammatory diseases.  · Inherited (genetic) diseases that increase the risk of blood clots.  · Hormone therapy, such as birth control pills.  What are the signs or symptoms?  · Tender, swollen, and painful area on your skin. Usually, the area will be long and narrow.  · Firmness along the center of the affected area. This can indicate that a blood clot has formed.  · Low-grade fever.  How is this diagnosed?  A health care provider can usually diagnose phlebitis by examining the affected area and asking about your symptoms. To check for infection or blood clots, your health care provider may order blood tests or an ultrasound exam of the area. Blood tests and your family history may also indicate if you have an underlying genetic disease that causes blood clots. Occasionally, a piece of tissue is taken from the body (biopsy sample) if an unusual cause of phlebitis is suspected.  How is this treated?  Treatment will vary depending on the severity of the condition and the area of the body affected. Treatment may include:  · Use of a warm compress or heating pad.  · Use of compression stockings or bandages.  · Anti-inflammatory medicines.  · Removal of any IV tube  that may be causing the problem.  · Medicines that kill germs (antibiotics) if an infection is present.  · Blood-thinning medicines if a blood clot is suspected or present.  · In rare cases, surgery may be needed to remove damaged sections of vein.  Follow these instructions at home:  · Only take over-the-counter or prescription medicines as directed by your health care provider. Take all medicines exactly as prescribed.  · Raise (elevate) the affected area above the level of your heart as directed by your health care provider.  · Apply a warm compress or heating pad to the affected area as directed by your health care provider. Do not sleep with the heating pad.  · Use compression stockings or bandages as directed. These will speed healing and prevent the condition from coming back.  · If you are on blood thinners:  ¨ Get follow-up blood tests as directed by your health care provider.  ¨ Check with your health care provider before using any new medicines.  ¨ Carry a medical alert card or wear your medical alert jewelry to show that you are on blood thinners.  · For phlebitis in the legs:  ¨ Avoid prolonged standing or bed rest.  ¨ Keep your legs moving. Raise your legs when sitting or lying.  · Do not smoke.  · Women, particularly those over the age of 35, should consider the risks and benefits of taking the contraceptive pill. This kind of hormone treatment can increase your risk for blood clots.  · Follow up with your health care provider as directed.  Contact a health care provider if:  · You have unusual bruising or any bleeding problems.  · Your swelling or pain in the affected area is not improving.  · You are on anti-inflammatory medicine, and you develop belly (abdominal) pain.  Get help right away if:  · You have a sudden onset of chest pain or difficulty breathing.  · You have a fever or persistent symptoms for more than 2-3 days.  · You have a fever and your symptoms suddenly get worse.  This information  is not intended to replace advice given to you by your health care provider. Make sure you discuss any questions you have with your health care provider.  Document Released: 12/12/2002 Document Revised: 05/25/2017 Document Reviewed: 08/25/2014  [employer] Interactive Patient Education © 2017 [employer] Inc.

## 2018-06-24 NOTE — ED NOTES
US contacted about when exam is to be performed. Film room states the tech was called in for exam and should be getting started soon.

## 2018-06-26 ENCOUNTER — OFFICE VISIT (OUTPATIENT)
Dept: INTERNAL MEDICINE | Facility: MEDICAL CENTER | Age: 33
End: 2018-06-26
Payer: MEDICAID

## 2018-06-26 ENCOUNTER — TELEPHONE (OUTPATIENT)
Dept: INTERNAL MEDICINE | Facility: MEDICAL CENTER | Age: 33
End: 2018-06-26

## 2018-06-26 VITALS
WEIGHT: 190 LBS | BODY MASS INDEX: 30.53 KG/M2 | HEIGHT: 66 IN | SYSTOLIC BLOOD PRESSURE: 100 MMHG | HEART RATE: 58 BPM | OXYGEN SATURATION: 96 % | TEMPERATURE: 98.6 F | DIASTOLIC BLOOD PRESSURE: 70 MMHG

## 2018-06-26 DIAGNOSIS — R56.9 SEIZURES (HCC): ICD-10-CM

## 2018-06-26 DIAGNOSIS — F19.90 SUBSTANCE USE DISORDER: ICD-10-CM

## 2018-06-26 DIAGNOSIS — F32.A DEPRESSION, UNSPECIFIED DEPRESSION TYPE: ICD-10-CM

## 2018-06-26 DIAGNOSIS — I80.8 SUPERFICIAL THROMBOPHLEBITIS OF BOTH UPPER EXTREMITIES: ICD-10-CM

## 2018-06-26 PROCEDURE — 99214 OFFICE O/P EST MOD 30 MIN: CPT | Mod: GC | Performed by: INTERNAL MEDICINE

## 2018-06-26 NOTE — TELEPHONE ENCOUNTER
1. Caller Name: Mahamed                      Call Back Number: [phone] (home)       2. Message: Mahamed called and l/m. He was seen by  and he had Thrombophlebitis. He wants to make sure this is not from his Hep C medication.    3. Patient approves office to leave a detailed voicemail/MyChart message: N\A

## 2018-06-26 NOTE — PATIENT INSTRUCTIONS
Phlebitis  Phlebitis is a redness, tenderness and soreness (inflammation) in a vein. This can occur in your arms, legs, or torso (trunk), as well as deeper inside your body. This condition results in redness, tenderness, and firmness along the course of the vein. This problem is most often caused by local injury, IV drugs or medication, or prolonged bed rest.  You should rest and keep the affected area elevated until your symptoms get better. Put warm moist packs on the inflamed vein for 20 minutes, 3 to 4 times every day. Medicines to reduce inflammation and pain will also speed recovery.   SEEK MEDICAL CARE IF:   · You have unusual bruising or any bleeding problems.   · Swelling or pain in your affected arm or leg is not gradually improving.   · You are on anti-inflammatory medication and you develop belly (abdominal) pain.   SEEK IMMEDIATE MEDICAL CARE IF:   · You have an oral temperature above 102° F (38.9° C), not controlled by medicine.   · You have sudden onset of chest pain or difficulty breathing.   Document Released: 01/25/2006 Document Revised: 03/11/2013 Document Reviewed: 09/14/2010  [employer]® Patient Information ©2013 [employer].

## 2018-06-27 ENCOUNTER — HOSPITAL ENCOUNTER (EMERGENCY)
Dept: HOSPITAL 8 - ED | Age: 33
Discharge: HOME | End: 2018-06-27
Payer: MEDICAID

## 2018-06-27 VITALS — HEIGHT: 66 IN | BODY MASS INDEX: 30.33 KG/M2 | WEIGHT: 188.72 LBS

## 2018-06-27 VITALS — DIASTOLIC BLOOD PRESSURE: 78 MMHG | SYSTOLIC BLOOD PRESSURE: 112 MMHG

## 2018-06-27 DIAGNOSIS — J45.909: ICD-10-CM

## 2018-06-27 DIAGNOSIS — I87.8: Primary | ICD-10-CM

## 2018-06-27 PROCEDURE — 99284 EMERGENCY DEPT VISIT MOD MDM: CPT

## 2018-06-27 PROCEDURE — 93970 EXTREMITY STUDY: CPT

## 2018-06-27 NOTE — TELEPHONE ENCOUNTER
Cirilo Landis M.D.   You 14 hours ago (8:55 PM)     Please let him know the hepatitis C medicine has absolutely nothing to do with the phlebitis . Do not stop the treatment !!! (Routing comment)      Called pt and l/m. Notifying him that medication he is taking has nothing to do with the dx he got. Dr Landis would like him to continue the treatment and to not stop the medication.

## 2018-07-02 ENCOUNTER — APPOINTMENT (OUTPATIENT)
Dept: INTERNAL MEDICINE | Facility: MEDICAL CENTER | Age: 33
End: 2018-07-02
Payer: MEDICAID

## 2018-07-06 ENCOUNTER — HOSPITAL ENCOUNTER (EMERGENCY)
Facility: MEDICAL CENTER | Age: 33
End: 2018-07-06
Attending: EMERGENCY MEDICINE
Payer: MEDICAID

## 2018-07-06 ENCOUNTER — APPOINTMENT (OUTPATIENT)
Dept: RADIOLOGY | Facility: MEDICAL CENTER | Age: 33
End: 2018-07-06
Attending: EMERGENCY MEDICINE
Payer: MEDICAID

## 2018-07-06 VITALS
HEART RATE: 64 BPM | BODY MASS INDEX: 30.53 KG/M2 | DIASTOLIC BLOOD PRESSURE: 83 MMHG | TEMPERATURE: 96.5 F | WEIGHT: 189.15 LBS | RESPIRATION RATE: 15 BRPM | OXYGEN SATURATION: 97 % | SYSTOLIC BLOOD PRESSURE: 131 MMHG

## 2018-07-06 DIAGNOSIS — R07.9 CHEST PAIN, UNSPECIFIED TYPE: Primary | ICD-10-CM

## 2018-07-06 LAB
ALBUMIN SERPL BCP-MCNC: 4.4 G/DL (ref 3.2–4.9)
ALBUMIN/GLOB SERPL: 1.2 G/DL
ALP SERPL-CCNC: 80 U/L (ref 30–99)
ALT SERPL-CCNC: 23 U/L (ref 2–50)
ANION GAP SERPL CALC-SCNC: 7 MMOL/L (ref 0–11.9)
APTT PPP: 26.5 SEC (ref 24.7–36)
AST SERPL-CCNC: 24 U/L (ref 12–45)
BASOPHILS # BLD AUTO: 1.1 % (ref 0–1.8)
BASOPHILS # BLD: 0.11 K/UL (ref 0–0.12)
BILIRUB SERPL-MCNC: 0.7 MG/DL (ref 0.1–1.5)
BUN SERPL-MCNC: 17 MG/DL (ref 8–22)
CALCIUM SERPL-MCNC: 9.4 MG/DL (ref 8.5–10.5)
CHLORIDE SERPL-SCNC: 104 MMOL/L (ref 96–112)
CO2 SERPL-SCNC: 27 MMOL/L (ref 20–33)
CREAT SERPL-MCNC: 1.06 MG/DL (ref 0.5–1.4)
EKG IMPRESSION: NORMAL
EOSINOPHIL # BLD AUTO: 0.27 K/UL (ref 0–0.51)
EOSINOPHIL NFR BLD: 2.8 % (ref 0–6.9)
ERYTHROCYTE [DISTWIDTH] IN BLOOD BY AUTOMATED COUNT: 40.9 FL (ref 35.9–50)
GLOBULIN SER CALC-MCNC: 3.7 G/DL (ref 1.9–3.5)
GLUCOSE SERPL-MCNC: 90 MG/DL (ref 65–99)
HCT VFR BLD AUTO: 53.7 % (ref 42–52)
HGB BLD-MCNC: 18.7 G/DL (ref 14–18)
IMM GRANULOCYTES # BLD AUTO: 0.04 K/UL (ref 0–0.11)
IMM GRANULOCYTES NFR BLD AUTO: 0.4 % (ref 0–0.9)
INR PPP: 0.89 (ref 0.87–1.13)
LYMPHOCYTES # BLD AUTO: 3.22 K/UL (ref 1–4.8)
LYMPHOCYTES NFR BLD: 32.9 % (ref 22–41)
MCH RBC QN AUTO: 30.8 PG (ref 27–33)
MCHC RBC AUTO-ENTMCNC: 34.8 G/DL (ref 33.7–35.3)
MCV RBC AUTO: 88.3 FL (ref 81.4–97.8)
MONOCYTES # BLD AUTO: 0.94 K/UL (ref 0–0.85)
MONOCYTES NFR BLD AUTO: 9.6 % (ref 0–13.4)
NEUTROPHILS # BLD AUTO: 5.21 K/UL (ref 1.82–7.42)
NEUTROPHILS NFR BLD: 53.2 % (ref 44–72)
NRBC # BLD AUTO: 0 K/UL
NRBC BLD-RTO: 0 /100 WBC
PLATELET # BLD AUTO: 272 K/UL (ref 164–446)
PMV BLD AUTO: 9.9 FL (ref 9–12.9)
POTASSIUM SERPL-SCNC: 4.4 MMOL/L (ref 3.6–5.5)
PROT SERPL-MCNC: 8.1 G/DL (ref 6–8.2)
PROTHROMBIN TIME: 11.8 SEC (ref 12–14.6)
RBC # BLD AUTO: 6.08 M/UL (ref 4.7–6.1)
SODIUM SERPL-SCNC: 138 MMOL/L (ref 135–145)
TROPONIN I SERPL-MCNC: <0.01 NG/ML (ref 0–0.04)
WBC # BLD AUTO: 9.8 K/UL (ref 4.8–10.8)

## 2018-07-06 PROCEDURE — 85730 THROMBOPLASTIN TIME PARTIAL: CPT

## 2018-07-06 PROCEDURE — 93005 ELECTROCARDIOGRAM TRACING: CPT | Performed by: EMERGENCY MEDICINE

## 2018-07-06 PROCEDURE — 700111 HCHG RX REV CODE 636 W/ 250 OVERRIDE (IP): Performed by: EMERGENCY MEDICINE

## 2018-07-06 PROCEDURE — 96374 THER/PROPH/DIAG INJ IV PUSH: CPT

## 2018-07-06 PROCEDURE — 94760 N-INVAS EAR/PLS OXIMETRY 1: CPT

## 2018-07-06 PROCEDURE — 71275 CT ANGIOGRAPHY CHEST: CPT

## 2018-07-06 PROCEDURE — 85025 COMPLETE CBC W/AUTO DIFF WBC: CPT

## 2018-07-06 PROCEDURE — 700117 HCHG RX CONTRAST REV CODE 255: Performed by: EMERGENCY MEDICINE

## 2018-07-06 PROCEDURE — 80053 COMPREHEN METABOLIC PANEL: CPT

## 2018-07-06 PROCEDURE — 85610 PROTHROMBIN TIME: CPT

## 2018-07-06 PROCEDURE — 99284 EMERGENCY DEPT VISIT MOD MDM: CPT

## 2018-07-06 PROCEDURE — 93005 ELECTROCARDIOGRAM TRACING: CPT

## 2018-07-06 PROCEDURE — 84484 ASSAY OF TROPONIN QUANT: CPT

## 2018-07-06 RX ORDER — KETOROLAC TROMETHAMINE 30 MG/ML
30 INJECTION, SOLUTION INTRAMUSCULAR; INTRAVENOUS ONCE
Status: COMPLETED | OUTPATIENT
Start: 2018-07-06 | End: 2018-07-06

## 2018-07-06 RX ORDER — IBUPROFEN 600 MG/1
600 TABLET ORAL EVERY 6 HOURS PRN
Qty: 20 TAB | Refills: 0 | Status: SHIPPED | OUTPATIENT
Start: 2018-07-06 | End: 2018-08-27

## 2018-07-06 RX ADMIN — IOHEXOL 65 ML: 350 INJECTION, SOLUTION INTRAVENOUS at 10:09

## 2018-07-06 RX ADMIN — KETOROLAC TROMETHAMINE 30 MG: 30 INJECTION, SOLUTION INTRAMUSCULAR at 08:41

## 2018-07-06 ASSESSMENT — PAIN SCALES - GENERAL
PAINLEVEL_OUTOF10: 4
PAINLEVEL_OUTOF10: 6

## 2018-07-06 NOTE — DISCHARGE INSTRUCTIONS
Follow-up with primary care early next week for reevaluation, medication management close blood pressure monitoring and additional workup/repeat imaging as indicated for lung nodule.    Motrin every 6 hours as needed for discomfort.  Activity as tolerated.    Return to the emergency department for persistent or worsening chest pain, shortness breath, palpitations, syncope, fever or other new concerns.    Chest Pain, Nonspecific  It is often hard to give a specific diagnosis for the cause of chest pain. There is always a chance that your pain could be related to something serious, like a heart attack or a blood clot in the lungs. You need to follow up with your caregiver for further evaluation. More lab tests or other studies such as X-rays, electrocardiography, stress testing, or cardiac imaging may be needed to find the cause of your pain.  Most of the time, nonspecific chest pain improves within 2 to 3 days with rest and mild pain medicine. For the next few days, avoid physical exertion or activities that bring on pain. Do not smoke. Avoid drinking alcohol. Call your caregiver for routine follow-up as advised.   SEEK IMMEDIATE MEDICAL CARE IF:  · You develop increased chest pain or pain that radiates to the arm, neck, jaw, back, or abdomen.   · You develop shortness of breath, increased coughing, or you start coughing up blood.   · You have severe back or abdominal pain, nausea, or vomiting.   · You develop severe weakness, fainting, fever, or chills.   Document Released: 12/18/2006 Document Revised: 03/11/2013 Document Reviewed: 06/06/2008  [employer]® Patient Information ©2013 [employer].

## 2018-07-06 NOTE — ED TRIAGE NOTES
Chief Complaint   Patient presents with   • Chest Pain     Pt reports to have mid chest pain for the last 2 hrs/ pt reports pain feels like a pulled muscle/ reports pain to swallow/ pain radiates to back     Explained to pt triage process, made pt aware to tell this RN/staff of any changes/concerns, pt verbalized understanding of process and instructions given. Pt to ER lobby.
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
## 2018-07-09 DIAGNOSIS — B18.2 CHRONIC HEPATITIS C WITHOUT HEPATIC COMA (HCC): ICD-10-CM

## 2018-07-10 ENCOUNTER — OFFICE VISIT (OUTPATIENT)
Dept: INTERNAL MEDICINE | Facility: MEDICAL CENTER | Age: 33
End: 2018-07-10
Payer: MEDICAID

## 2018-07-10 VITALS
BODY MASS INDEX: 30.28 KG/M2 | WEIGHT: 188.38 LBS | DIASTOLIC BLOOD PRESSURE: 74 MMHG | SYSTOLIC BLOOD PRESSURE: 104 MMHG | HEIGHT: 66 IN | HEART RATE: 82 BPM | TEMPERATURE: 97.5 F | OXYGEN SATURATION: 97 %

## 2018-07-10 DIAGNOSIS — B18.2 CHRONIC HEPATITIS C WITHOUT HEPATIC COMA (HCC): ICD-10-CM

## 2018-07-10 DIAGNOSIS — M79.606 UPPER AND LOWER EXTREMITY PAIN: ICD-10-CM

## 2018-07-10 DIAGNOSIS — M79.603 UPPER AND LOWER EXTREMITY PAIN: ICD-10-CM

## 2018-07-10 DIAGNOSIS — R56.9 SEIZURES (HCC): ICD-10-CM

## 2018-07-10 PROCEDURE — 99214 OFFICE O/P EST MOD 30 MIN: CPT | Mod: GC | Performed by: INTERNAL MEDICINE

## 2018-07-10 NOTE — PROGRESS NOTES
Established Patient    Mahamed presents today with the following:    CC: Upper and lower extremity pain    HPI: 33-year-old male with a PMH of hepatitis C, right forearm thrombophlebitis and recent discharge from ED on 7/6/18 for chest pain workup for which EKG and troponins were negative for ACS and CT PE was negative for PE comes to clinic with a 3 week h/o of worsening, episodic, upper and lower extremity pain which she describes as a burning/stretching sensation. He associates it with bilateral upper and lower extremity numbness/paresthesia, fever, chills, night sweats, worsening tension-like headache, B/L ear fulness, tinnitus and pleuritic chest pain. He denies palpitations, dyspnea at rest or exertion, hemoptysis, hematemesis, hematochezia or melena, dizziness or loss of consciousness.    Patient Active Problem List    Diagnosis Date Noted   • Upper and lower extremity pain 07/10/2018   • Seizures (HCC) 04/18/2018   • Depression 04/18/2018   • Hepatitis C 04/18/2018   • Bipolar 1 disorder (HCC) 04/18/2018   • Substance use disorder 04/18/2018       Current Outpatient Prescriptions   Medication Sig Dispense Refill   • glecaprevir-pibrentasvir 100-40 mg tablet Take 3 Tabs by mouth every day at 6 PM. 84 Tab 1   • Sertraline HCl (ZOLOFT PO) Take  by mouth.     • levETIRAcetam (KEPPRA) 500 MG Tab Take 2 Tabs by mouth 2 times a day. 60 Tab 0   • ibuprofen (MOTRIN) 600 MG Tab Take 1 Tab by mouth every 6 hours as needed. (Patient not taking: Reported on 7/10/2018) 20 Tab 0   • naproxen (NAPROSYN) 500 MG Tab Take 1 Tab by mouth 2 times a day, with meals. (Patient not taking: Reported on 7/10/2018) 20 Tab 0     No current facility-administered medications for this visit.        ROS: As per HPI. Additional pertinent symptoms as noted below.  Constitutional: no fevers, chills, weight change  Eyes: no blurred vision, discharge, eye pain  ENT: no rhinorrhea, sore throat, neck masses  Cardiovascular: no angina,  "palpitations, PND, orthopnea, edema  Respiratory: no cough, sputum, or dyspnea  GI: no nausea, vomiting, abdominal pain, constipation, or diarrhea  : no dysuria, hematuria, frequency   Musculo-skeletal: B/L upper/lower ext pain  Skin: no rashes or open wounds  Neurological: no headaches, dizziness, motor/sensory loss  Psychological: no anxiety or depression      /74   Pulse 82   Temp 36.4 °C (97.5 °F)   Ht 1.676 m (5' 6\")   Wt 85.4 kg (188 lb 6 oz)   SpO2 97%   BMI 30.40 kg/m²     Physical Exam   Constitutional:  oriented to person, place, and time. No distress.   Eyes: Pupils are equal, round, and reactive to light. No scleral icterus.  Neck: Neck supple. No thyromegaly present.   Cardiovascular: Normal rate, regular rhythm and normal heart sounds.  Exam reveals no gallop and no friction rub.  No murmur heard.  Pulmonary/Chest: Breath sounds normal. Chest wall is not dull to percussion.   Musculoskeletal:   Right dorsal forearm, hard, 2 cm, skin induration   Lymphadenopathy: no cervical adenopathy  Neurological: alert and oriented to person, place, and time.   Skin: No cyanosis. Nails show no clubbing.      Note: I have reviewed all pertinent labs and diagnostic tests associated with this visit with specific comments listed under the assessment and plan below    Assessment and Plan    1. Upper and lower extremity pain  - Reporting a 3 wk h/o of worsening, episodic, burning/stretching pain  - Reports generalized weakness, F/C and night sweats  - Reports numbness/tingling  - H/O of RUE dorsal superficial thrombophlebitis  - [facility] UE U/S showed a right dorsal forearm 6mm subcutaneous mass  - Was started on warm compresses but reports no relive  - Recent DC from ED on 7/6/18 for neg chest pain w/u  - EKG, and trops neg for ACS  - CTPE neg for PE but showed mild diffuse interstitial opacities   - Neuropathic type extremity pain and CTPE with mild diffuse interstitial opacities in the seating of " chronic hep C is conserning for Cryoglobulinemia   - Pending Serum Cryoglobulins, C3, C4, RF and RICHARD  - Follow up in 1 week    2. Chronic hepatitis C without hepatic coma (HCC)  - Hep C Dx 2 yr ago  - Follows with Dr. Landis   - Started on Glecaprevir-pibrentasvir on 5/24/18  - Last HCV RNA at   - Continue f/u with Dr. Landis    3. Seizures (HCC)  - Last Seizure more than a month ago  - Continue Keppra  - F/U with PCP      Followup: Return in about 1 week (around 7/17/2018) for Long.      Signed by: Nicolas Booth M.D.

## 2018-07-11 ENCOUNTER — HOSPITAL ENCOUNTER (EMERGENCY)
Facility: MEDICAL CENTER | Age: 33
End: 2018-07-11
Attending: EMERGENCY MEDICINE
Payer: MEDICAID

## 2018-07-11 VITALS
HEART RATE: 67 BPM | TEMPERATURE: 98.2 F | RESPIRATION RATE: 16 BRPM | WEIGHT: 188 LBS | OXYGEN SATURATION: 96 % | SYSTOLIC BLOOD PRESSURE: 124 MMHG | DIASTOLIC BLOOD PRESSURE: 78 MMHG | BODY MASS INDEX: 30.22 KG/M2 | HEIGHT: 66 IN

## 2018-07-11 DIAGNOSIS — R56.9 SEIZURE (HCC): ICD-10-CM

## 2018-07-11 LAB
ALBUMIN SERPL BCP-MCNC: 4.1 G/DL (ref 3.2–4.9)
ALBUMIN/GLOB SERPL: 1.3 G/DL
ALP SERPL-CCNC: 68 U/L (ref 30–99)
ALT SERPL-CCNC: 18 U/L (ref 2–50)
ANION GAP SERPL CALC-SCNC: 7 MMOL/L (ref 0–11.9)
AST SERPL-CCNC: 18 U/L (ref 12–45)
BASOPHILS # BLD AUTO: 1.1 % (ref 0–1.8)
BASOPHILS # BLD: 0.09 K/UL (ref 0–0.12)
BILIRUB SERPL-MCNC: 0.8 MG/DL (ref 0.1–1.5)
BUN SERPL-MCNC: 21 MG/DL (ref 8–22)
CALCIUM SERPL-MCNC: 9.2 MG/DL (ref 8.5–10.5)
CHLORIDE SERPL-SCNC: 108 MMOL/L (ref 96–112)
CO2 SERPL-SCNC: 23 MMOL/L (ref 20–33)
CREAT SERPL-MCNC: 1.1 MG/DL (ref 0.5–1.4)
EOSINOPHIL # BLD AUTO: 0.3 K/UL (ref 0–0.51)
EOSINOPHIL NFR BLD: 3.7 % (ref 0–6.9)
ERYTHROCYTE [DISTWIDTH] IN BLOOD BY AUTOMATED COUNT: 39.9 FL (ref 35.9–50)
GLOBULIN SER CALC-MCNC: 3.2 G/DL (ref 1.9–3.5)
GLUCOSE SERPL-MCNC: 79 MG/DL (ref 65–99)
HCT VFR BLD AUTO: 47 % (ref 42–52)
HGB BLD-MCNC: 16.7 G/DL (ref 14–18)
IMM GRANULOCYTES # BLD AUTO: 0.03 K/UL (ref 0–0.11)
IMM GRANULOCYTES NFR BLD AUTO: 0.4 % (ref 0–0.9)
LYMPHOCYTES # BLD AUTO: 3.59 K/UL (ref 1–4.8)
LYMPHOCYTES NFR BLD: 44.8 % (ref 22–41)
MCH RBC QN AUTO: 31.4 PG (ref 27–33)
MCHC RBC AUTO-ENTMCNC: 35.5 G/DL (ref 33.7–35.3)
MCV RBC AUTO: 88.3 FL (ref 81.4–97.8)
MONOCYTES # BLD AUTO: 0.99 K/UL (ref 0–0.85)
MONOCYTES NFR BLD AUTO: 12.4 % (ref 0–13.4)
NEUTROPHILS # BLD AUTO: 3.01 K/UL (ref 1.82–7.42)
NEUTROPHILS NFR BLD: 37.6 % (ref 44–72)
NRBC # BLD AUTO: 0 K/UL
NRBC BLD-RTO: 0 /100 WBC
PLATELET # BLD AUTO: 254 K/UL (ref 164–446)
PMV BLD AUTO: 9.8 FL (ref 9–12.9)
POTASSIUM SERPL-SCNC: 3.7 MMOL/L (ref 3.6–5.5)
PROT SERPL-MCNC: 7.3 G/DL (ref 6–8.2)
RBC # BLD AUTO: 5.32 M/UL (ref 4.7–6.1)
SODIUM SERPL-SCNC: 138 MMOL/L (ref 135–145)
WBC # BLD AUTO: 8 K/UL (ref 4.8–10.8)

## 2018-07-11 PROCEDURE — 80053 COMPREHEN METABOLIC PANEL: CPT

## 2018-07-11 PROCEDURE — 85025 COMPLETE CBC W/AUTO DIFF WBC: CPT

## 2018-07-11 PROCEDURE — 99284 EMERGENCY DEPT VISIT MOD MDM: CPT

## 2018-07-11 NOTE — ED TRIAGE NOTES
"Pt arrived via [facility] from [facility]. Per EMS staff at [facility] found pt having seizure. EMS states staff witnessed \"four back to back seizures.\" EMS states on arrival to scene they witness pt having possible seizure like activity, states eyes rolled back with tonic clonic movement. EMS states she was about to give medication for seizure when pt stopped seizing with no very short post ictal phase. States pt was a/o x4 right after seizure.     Pt was not given any meds PTA. Pt states he is court ordered to be at [facility] for meth/heroin detox. Last use 6 months ago. Pt states he takes Keppra as prescribed for seizures. .    A/o x4, speaking in full sentences.   "

## 2018-07-12 ENCOUNTER — TELEPHONE (OUTPATIENT)
Dept: INTERNAL MEDICINE | Facility: MEDICAL CENTER | Age: 33
End: 2018-07-12

## 2018-07-12 ENCOUNTER — OFFICE VISIT (OUTPATIENT)
Dept: INTERNAL MEDICINE | Facility: MEDICAL CENTER | Age: 33
End: 2018-07-12
Payer: MEDICAID

## 2018-07-12 VITALS
BODY MASS INDEX: 30.53 KG/M2 | HEART RATE: 69 BPM | DIASTOLIC BLOOD PRESSURE: 74 MMHG | HEIGHT: 66 IN | WEIGHT: 190 LBS | OXYGEN SATURATION: 97 % | TEMPERATURE: 97.7 F | SYSTOLIC BLOOD PRESSURE: 112 MMHG

## 2018-07-12 DIAGNOSIS — I80.8 SUPERFICIAL THROMBOPHLEBITIS OF BOTH UPPER EXTREMITIES: ICD-10-CM

## 2018-07-12 DIAGNOSIS — R73.01 FASTING HYPERGLYCEMIA: ICD-10-CM

## 2018-07-12 DIAGNOSIS — R07.9 NONSPECIFIC CHEST PAIN: ICD-10-CM

## 2018-07-12 LAB
HBA1C MFR BLD: 5 % (ref ?–5.8)
INT CON NEG: NEGATIVE
INT CON POS: POSITIVE

## 2018-07-12 PROCEDURE — 99213 OFFICE O/P EST LOW 20 MIN: CPT | Mod: GE | Performed by: INTERNAL MEDICINE

## 2018-07-12 PROCEDURE — 83036 HEMOGLOBIN GLYCOSYLATED A1C: CPT | Mod: GC | Performed by: INTERNAL MEDICINE

## 2018-07-12 RX ORDER — GABAPENTIN 100 MG/1
100 CAPSULE ORAL
Qty: 30 CAP | Refills: 0 | Status: SHIPPED | OUTPATIENT
Start: 2018-07-12 | End: 2018-08-27

## 2018-07-12 NOTE — TELEPHONE ENCOUNTER
1. Caller Name: Marie-Nurse from [employer]                      Call Back Number: [phone]    2. Message: Marie from [facility] called and l/m. Pt got his lab work done on Tuesday at [facility]. Pt had a seizure yesterday afternoon witnessed after 4:30pm. [facility] came. Pt was out of it and went to the ER. They monitored him and released him. She wants to give us a heads up. There is something going on with him. Also with the neurology referral. Pt got an appt with Dr Marie Sidhu on 08/27/18. Would like to get advised on hwat to do. Pt feels very poorly.     3. Patient approves office to leave a detailed voicemail/MyChart message: N\A    (This morning front office scheduled pt an appt for this afternoon with Dr Garvin. At 3:15 for a hospital follow up.)

## 2018-07-12 NOTE — ED PROVIDER NOTES
ED Provider Note    HPI: Patient is a 33-year-old male who presented to the emergency department by ambulance transfer July 11, 2018 4:42 PM with a chief complaint of a seizure.    Patient had several seizures earlier today was very brief postictal period. Patient now feels back to normal. The patient is admitted inpatient rehab facility for methamphetamine/heroin detox. The patient has been taking his Keppra as prescribed. Patient states he has had seizures in the past when withdrawing from heroin or methamphetamines. He cannot have Ativan as it makes him very belligerent as to other benzodiazepines. No fever no chills no cough no nausea no vomiting no chest pain or shortness of breath. Patient denies any recent head trauma. No other somatic complaint    Review of Systems: Positive for seizure negative for fever chills cough nausea vomiting chest pain shortness of breath. Review of systems reviewed with patient, all other systems negative    Past medical/surgical history: Seizure substance abuse bipolar disorder and surgery denies any drug use for 6 months plus no alcohol use    Medications: Naprosyn and Motrin    Allergies: Patient does not tolerate benzodiazepines    Social History: Patient smokes one pack of cigarettes per day. No drug use for 6+ months    Physical exam: Pleasant male awake alert  Vital signs: Temperature 98.1 pulse 67 respirations 16 blood pressure 124/78 pulse oximetry 95%  Ocular: pupils equal and reactive to light extraocular movements intact conjunctiva and sclera clear eyelids normal bilaterally  Neck: Free range of motion supple no meningeal signs elicited. Trachea midline.  Cardiac: Regular rate and rhythm. S1 and S2 present. S3-S4 absent. No murmurs rubs or gallops heard.  Lungs: Clear to auscultation with good aeration throughout. No wheezes rales or rhonchi heard. Patient's chest wall moved symmetrically with each respiratory effort. Patient was not making use of accessory muscles of  respiration in breathing.  Abdomen: Soft, nontender to palpation. No rebound or guarding elicited. No organomegaly identified  Musculoskeletal: No pain with palpation or movement of muscle bone or joint. No musculoskeletal deformities identified  Dermatologic: No rash or lesions seen. No palpable dermatologic lesions identified  Neurologic: Patient is awake and alert answer questions appropriately. Moves all 4 extremity is independently. No gross focal neurologic abnormalities identified.  Psychiatric: Patient appeared to be slightly anxious but not appropriately so. He was not delusional or hallucinating    Medical decision making: Laboratory studies obtained (please see lab sheet for all results) significant findings included a remarkable CBC and chemistry panel.    Patient be discharged back to his rehab facility. He is advised to take an extra dose of his seizure medicine this evening. He'll return to the ED for any further problems. Given the history of seizure activity and no recent trauma and did not think imaging would be emergently helpful    Impression seizure

## 2018-07-12 NOTE — TELEPHONE ENCOUNTER
[facility]                      Call Back Number: [phone]    2. Message: Marie called and l/m. She would like Dr Dr Landis's verdict on pt medication management with HCV. Pt is more systemic.     3. Patient approves office to leave a detailed voicemail/MyChart message: N\A      Dr Landis please advise

## 2018-07-12 NOTE — DISCHARGE INSTRUCTIONS
Seizure, Adult  Take 1 extra dose of your seizure medicine this evening. Follow-up with your doctor for general care. Return to ED for any problems.  A seizure is a sudden burst of abnormal electrical activity in the brain. The abnormal activity temporarily interrupts normal brain function, causing a person to experience any of the following:  · Involuntary movements.  · Changes in awareness or consciousness.  · Uncontrollable shaking (convulsions).  Seizures usually last from 30 seconds to 2 minutes. They usually do not cause permanent brain damage unless they are prolonged.  What can cause a seizure to happen?  Seizures can happen for many reasons including:  · A fever.  · Low blood sugar.  · A medicine.  · An illnesses.  · A brain injury.  Some people who have a seizure never have another one. People who have repeated seizures have a condition called epilepsy.  What are the symptoms of a seizure?  Symptoms of a seizure vary greatly from person to person. They include:  · Convulsions.  · Stiffening of the body.  · Involuntary movements of the arms or legs.  · Loss of consciousness.  · Breathing problems.  · Falling suddenly.  · Confusion.  · Head nodding.  · Eye blinking or fluttering.  · Lip smacking.  · Drooling.  · Rapid eye movements.  · Grunting.  · Loss of bladder control and bowel control.  · Staring.  · Unresponsiveness.  Some people have symptoms right before a seizure happens (aura) and right after a seizure happens. Symptoms of an aura include:  · Fear or anxiety.  · Nausea.  · Feeling like the room is spinning (vertigo).  · A feeling of having seen or heard something before (meagan vu).  · Odd tastes or smells.  · Changes in vision, such as seeing flashing lights or spots.  Symptoms that may follow a seizure include:  · Confusion.  · Sleepiness.  · Headache.  · Weakness of one side of the body.  Follow these instructions at home:  Medicines  · Take over-the-counter and prescription medicines only as told  by your health care provider.  · Avoid any substances that may prevent your medicine from working properly, such as alcohol.  Activity  · Do not drive, swim, or do any other activities that would be dangerous if you had another seizure. Wait until your health care provider approves.  · If you live in the [place] check with your local DMV (department of motor vehicles) to find out about the local driving laws. Each state has specific rules about when you can legally return to driving.  · Get enough rest. Lack of sleep can make seizures more likely to occur.  Educating others  Teach friends and family what to do if you have a seizure. They should:  · Lay you on the ground to prevent a fall.  · Cushion your head and body.  · Loosen any tight clothing around your neck.  · Turn you on your side. If vomiting occurs, this helps keep your airway clear.  · Stay with you until you recover.  · Not hold you down. Holding you down will not stop the seizure.  · Not put anything in your mouth.  · Know whether or not you need emergency care.  General instructions  · Contact your health care provider each time you have a seizure.  · Avoid anything that has ever triggered a seizure for you.  · Keep a seizure diary. Record what you remember about each seizure, especially anything that might have triggered the seizure.  · Keep all follow-up visits as told by your health care provider. This is important.  Contact a health care provider if:  · You have another seizure.  · You have seizures more often.  · Your seizure symptoms change.  · You continue to have seizures with treatment.  · You have symptoms of an infection or illness. They might increase your risk of having a seizure.  Get help right away if:  · You have a seizure:  ¨ That lasts longer than 5 minutes.  ¨ That is different than previous seizures.  ¨ That leaves you unable to speak or use a part of your body.  ¨ That makes it harder to breathe.  ¨ After a head injury.  · You  have:  ¨ Multiple seizures in a row.  ¨ Confusion or a severe headache right after a seizure.  · You are having seizures more often.  · You do not wake up immediately after a seizure.  · You injure yourself during a seizure.  These symptoms may represent a serious problem that is an emergency. Do not wait to see if the symptoms will go away. Get medical help right away. Call your local emergency services (911 in the [place] Do not drive yourself to the hospital.   This information is not intended to replace advice given to you by your health care provider. Make sure you discuss any questions you have with your health care provider.  Document Released: 12/15/2001 Document Revised: 08/13/2017 Document Reviewed: 07/21/2017  Elsevier Interactive Patient Education © 2017 [employer]

## 2018-07-12 NOTE — PROGRESS NOTES
Established Patient    Mahamed Mae is a 33 y.o. male who presents today with the following:    CC: post-ED visit f/up; headache; non-specific chest / back pain; legs pain     HPI: 32 yo M with hx of IV drug use including meth and heroin who has been in rehab since 1/2018, chronic superficial thrombophlebitis and hep C ( on treatment with Mavyret ) , visited our office today for above cc. He is accompanied by his care giver from drug rehab program. Patient was seen in ED yesterday for seizure which occurred at rehab center lasted about 5 mins. He was advised to take an extra pill of his anti-seizure meds and discharged.     Today he is complaining of headache, nonspecific chest/back pain, bilateral lower extremities and upper extremities pain. Symptoms have been going on for some time since he started taking hep C treatment medications. He was then in ED one time for the chest pain, was discharged after initial troponin and EKG was negative for acute ischemia. CT PE was negative for pulmonary embolus.    He was just seen 2 days ago in our clinic by Dr. Gonzales Booth for extremities pain and was referred to vascular surgery for his chronic superficial thrombophlebitis. Labs including cryoglobulin, C3, C4, RF and RICHARD were ordered, pending results at this point.    He describes his pain as the whole chest and back intermittently , lasted for a few hours, aggravated by anxiety, alleviated by rest. Not related to a junctional activity. No dyspnea, cough, lightheadedness, diaphoresis associated with. No reported recent injury or heavy weight lifting.    He is taking Zoloft for depression and the dosage was increased yesterday by his psychiatric.  PHQ 9 score 13.        Patient Active Problem List    Diagnosis Date Noted   • Upper and lower extremity pain 07/10/2018   • Seizures (HCC) 04/18/2018   • Depression 04/18/2018   • Hepatitis C 04/18/2018   • Bipolar 1 disorder (HCC) 04/18/2018   • Substance use  "disorder 04/18/2018       Current Outpatient Prescriptions   Medication Sig Dispense Refill   • gabapentin (NEURONTIN) 100 MG Cap Take 1 Cap by mouth at bedtime as needed. 30 Cap 0   • ibuprofen (MOTRIN) 600 MG Tab Take 1 Tab by mouth every 6 hours as needed. 20 Tab 0   • naproxen (NAPROSYN) 500 MG Tab Take 1 Tab by mouth 2 times a day, with meals. 20 Tab 0   • glecaprevir-pibrentasvir 100-40 mg tablet Take 3 Tabs by mouth every day at 6 PM. 84 Tab 1   • Sertraline HCl (ZOLOFT PO) Take 150 mg by mouth every day.     • levETIRAcetam (KEPPRA) 500 MG Tab Take 2 Tabs by mouth 2 times a day. 60 Tab 0     No current facility-administered medications for this visit.        ROS: As per HPI. Additional pertinent symptoms as noted below:     Patient denies chest pain, dyspnea, orthopnea, PND, edema, cough, fever, chills, nausea / vomiting, abdominal pain, dysuria, changes in appetite or weight, diarrhea / constipation, headache, tingling / numbness sensation, weakness, visual changes.       /74   Pulse 69   Temp 36.5 °C (97.7 °F)   Ht 1.676 m (5' 6\")   Wt 86.2 kg (190 lb)   SpO2 97%   BMI 30.67 kg/m²     Physical Exam   Constitutional:  oriented to person, place, and time. No distress.   Eyes: Pupils are equal, round, and reactive to light. No scleral icterus.  Neck: Neck supple. No thyromegaly present.   Cardiovascular: Normal rate, regular rhythm and normal heart sounds.  Exam reveals no gallop and no friction rub.  No murmur heard.  Chest: No palpable tenderness.  Pulmonary/Chest: Breath sounds normal. Chest wall is not dull to percussion.   Musculoskeletal:   no edema.   Lymphadenopathy: no cervical adenopathy  Neurological: alert and oriented to person, place, and time.   Skin: No cyanosis. Nails show no clubbing.        Assessment and Plan      1. Nonspecific chest pain / back pain / extremities pain  2. Headache  Started a couple of weeks ago after he started taking hep C treatment medication.  Adverse " effect of drug  Mavyret reviewed which include headache, GI side effects.  His headache could possibly be explained from Mavyret but not his other complaints of nonspecific pain.    ? neuropathic pain though not burning in character.  Unlikely diabetes-     POCT  A1C 5%.    Wall started empirically on gabapentin.   -     gabapentin (NEURONTIN) 100 MG Cap; Take 1 Cap by mouth at bedtime as needed.    Encouraged patient to work-out / exercise which might help with the symptom.   Supportive management - eg. Hot shower.       3. Chronic superficial thrombophlebitis  Follow up with vascular surgery.         Followup: Follow up with primary care.     Signed by: Harry Garvin M.D.

## 2018-07-13 DIAGNOSIS — M79.606 UPPER AND LOWER EXTREMITY PAIN: ICD-10-CM

## 2018-07-13 DIAGNOSIS — M79.603 UPPER AND LOWER EXTREMITY PAIN: ICD-10-CM

## 2018-07-13 DIAGNOSIS — B18.2 CHRONIC HEPATITIS C WITHOUT HEPATIC COMA (HCC): ICD-10-CM

## 2018-07-18 ENCOUNTER — TELEPHONE (OUTPATIENT)
Dept: INTERNAL MEDICINE | Facility: MEDICAL CENTER | Age: 33
End: 2018-07-18

## 2018-07-18 NOTE — TELEPHONE ENCOUNTER
Called about patient going to ER for multiple complaints including seizures. He was started on Mavyret end of May for planned 8 week regimen. Got labs as planned end of  4 weeks. He did not keep f/u appt with me to see how he was doing. However, he had a remarkable response on therapy, with now complete normalization of severely elevated Transaminases and non- detectable HCV RNA. He is on Keppra for hx of seizures and has ongoing seizures while on Mavyret. Keppra is the only seizure medication specifically indicated as safe with HCV therapy.   I cannot explain any of his various complaints and relate it to the HCV therapy, However, as he has not been seen by me since starting therapy, I will have our office call his facility and stop the mavyret.

## 2018-07-18 NOTE — TELEPHONE ENCOUNTER
Spoke with Dr Landis as well regarding message below. He saw it's okay for pt to stopped Mavyret if pt would like to.      Called and spoke with Margot at [facility]. Notified her of Dr Landis's message below.  She said she don't agree. Has does it explain his sxs he has been having. Nobody in our office has address his sxs. That she read on line about Mavyret and he has a lot of the side effects that on there. She said pt started this medication with no information about this medication. I explained to Margot. When a pt is referred to Dr Landis, he consults first with pt on what tx to place him on and also depending on his insurance on which medication pt can start with. After that pt has to come in again when the medication is shipped to him, which was at his appt on 05/31/18. At that appt Dr Landis explains and tells pt what to expect when on tx. Pt was suppose to f/u on 07/02/18. But had to explain due to an insurance issue.  She said she don't understand how the doctors are not addressing this.  I told her pt on come to his appt on Monday 07/23/18 with Dr Landis and discuss further in person. She asked what about the meantime. I told her I can make pt an appt to see one of our residents. She said they haven't addressed it below. I said to Margot I'm not sure what else I'm able to help with. She said I will talk with your . I told her I will let Tre, our  know.

## 2018-07-23 ENCOUNTER — OFFICE VISIT (OUTPATIENT)
Dept: INTERNAL MEDICINE | Facility: MEDICAL CENTER | Age: 33
End: 2018-07-23
Payer: MEDICAID

## 2018-07-23 VITALS
DIASTOLIC BLOOD PRESSURE: 80 MMHG | WEIGHT: 189.2 LBS | HEIGHT: 66 IN | SYSTOLIC BLOOD PRESSURE: 102 MMHG | BODY MASS INDEX: 30.41 KG/M2 | OXYGEN SATURATION: 96 % | TEMPERATURE: 96.9 F | HEART RATE: 62 BPM

## 2018-07-23 DIAGNOSIS — B18.2 CHRONIC HEPATITIS C WITHOUT HEPATIC COMA (HCC): ICD-10-CM

## 2018-07-23 PROCEDURE — 99213 OFFICE O/P EST LOW 20 MIN: CPT | Performed by: INTERNAL MEDICINE

## 2018-07-23 ASSESSMENT — ENCOUNTER SYMPTOMS
CONSTITUTIONAL NEGATIVE: 1
MUSCULOSKELETAL NEGATIVE: 1
NERVOUS/ANXIOUS: 1
HEADACHES: 1
RESPIRATORY NEGATIVE: 1

## 2018-07-23 NOTE — PROGRESS NOTES
"Subjective:      Mahamed Mae is a 33 y.o. male who is seen in f/u for HCV genotype 1a  therapy   HPI:   He was diagnosed about a year ago when was admitted for detox at [place].  States likely infected from sharing needles- used both amphetamines and heroin- starting age 13. Never previously tested, never turned jaundiced, never went to plasma center to get tested.   Now living in a transitional living center- [facility], has been there 8 months, can stay for at least another year if needed. Pleased with how he is doing, no relapses, looking forward to taking better care of himself  He does not drink, has never had a problem with it or liked alcohol   He was initially seen at [facility], and received one [facility] vaccine. Was going there to see the psych NP, she has now moved to [place] and will cont to see her at the new facility  Was approved for 8 weeks of Mavyret, however we decided to stop it at end of 7 weeks (July 18) after cocnern it may have effected his seizures and given him a headache for last 3 weeks      Also on naltrexone     PMH- Siezure disorder, - he is on Keppra, will be getting [facility]. He does not drive  Mental Health - on seroquel and Zoloft           HPI      Review of Systems   Constitutional: Negative.    Respiratory: Negative.    Cardiovascular: Positive for chest pain.   Musculoskeletal: Negative.    Skin: Negative.    Neurological: Positive for headaches.   Psychiatric/Behavioral: The patient is nervous/anxious.           Objective:     /80   Pulse 62   Temp 36.1 °C (96.9 °F)   Ht 1.676 m (5' 6\")   Wt 85.8 kg (189 lb 3.2 oz)   SpO2 96%   BMI 30.54 kg/m²      Physical Exam   Constitutional: He is oriented to person, place, and time. He appears well-developed.   Pleasant cooperative male    HENT:   Nose: Nose normal.   Mouth/Throat: Oropharynx is clear and moist.   Pulmonary/Chest: Effort normal and breath sounds normal.   Abdominal: " Soft. Bowel sounds are normal.   Barely feel liver edge on palpation    Neurological: He is alert and oriented to person, place, and time.   Skin:   No spider angiomata  Old tattoos on chest   Psychiatric: He has a normal mood and affect. His behavior is normal. Thought content normal.           Labs- From 11/13/2017     - Fibrotest - Stage F2                               Activity- A3            HCV genotype - 1a             HCV RNA- 4,600,000 iiu/ml   eGFR-  82    Chem panel - [facility] - 4/24/2018    AST/ALT- 299/625   Albumin- 4.4  Platelets- 244,000  Albumin- 4.4  t. Bili        1.0    Ultrasound- negative     Labs from June 30 ( after 4 weeks)\    HCV RNA- negative  AST/ALT- 22/24           Assessment/Plan:     1. Chronic hepatitis C without hepatic coma (HCC)  33 year old former iv drug user, clean for 6 months, getting long term counseling, doing well.   Likely infected since childhood. Does not drink. Has Stage 2 with high activity on Fibrotest. \  Has completed 7 weeks of  mavyret. Not clear any of his present complaints are related to the Mavyret.  He has has a remarkable 4 week response, will get f/u labs in 12 weeks to see if SVR 12 - cured.  Mavyret is cleared quickly from his system and any side-effects should clear very soon

## 2018-07-25 ENCOUNTER — HOSPITAL ENCOUNTER (EMERGENCY)
Facility: MEDICAL CENTER | Age: 33
End: 2018-07-25
Attending: EMERGENCY MEDICINE
Payer: MEDICAID

## 2018-07-25 VITALS
DIASTOLIC BLOOD PRESSURE: 75 MMHG | RESPIRATION RATE: 16 BRPM | WEIGHT: 189.15 LBS | HEART RATE: 69 BPM | TEMPERATURE: 98 F | HEIGHT: 66 IN | SYSTOLIC BLOOD PRESSURE: 111 MMHG | BODY MASS INDEX: 30.4 KG/M2 | OXYGEN SATURATION: 96 %

## 2018-07-25 DIAGNOSIS — R56.9 SEIZURES (HCC): ICD-10-CM

## 2018-07-25 DIAGNOSIS — R56.9 SEIZURE (HCC): ICD-10-CM

## 2018-07-25 PROCEDURE — A9270 NON-COVERED ITEM OR SERVICE: HCPCS | Performed by: EMERGENCY MEDICINE

## 2018-07-25 PROCEDURE — 700102 HCHG RX REV CODE 250 W/ 637 OVERRIDE(OP): Performed by: EMERGENCY MEDICINE

## 2018-07-25 PROCEDURE — 99284 EMERGENCY DEPT VISIT MOD MDM: CPT

## 2018-07-25 RX ORDER — LEVETIRACETAM 500 MG/1
250 TABLET ORAL ONCE
Status: COMPLETED | OUTPATIENT
Start: 2018-07-25 | End: 2018-07-25

## 2018-07-25 RX ORDER — LEVETIRACETAM 500 MG/1
1000 TABLET ORAL 2 TIMES DAILY
Qty: 60 TAB | Refills: 0 | Status: SHIPPED | OUTPATIENT
Start: 2018-07-25 | End: 2018-07-25

## 2018-07-25 RX ORDER — LEVETIRACETAM 250 MG/1
250 TABLET ORAL 2 TIMES DAILY
Qty: 60 TAB | Refills: 0 | Status: SHIPPED | OUTPATIENT
Start: 2018-07-25 | End: 2018-07-25

## 2018-07-25 RX ORDER — LEVETIRACETAM 500 MG/1
1250 TABLET ORAL 2 TIMES DAILY
Qty: 150 TAB | Refills: 0 | Status: SHIPPED | OUTPATIENT
Start: 2018-07-25 | End: 2018-08-24

## 2018-07-25 RX ADMIN — LEVETIRACETAM 250 MG: 500 TABLET, FILM COATED ORAL at 15:30

## 2018-07-25 ASSESSMENT — PAIN SCALES - GENERAL: PAINLEVEL_OUTOF10: 0

## 2018-07-25 NOTE — ED NOTES
No additional sz activity noted.  Medicated as directed.   contacted to assist pt with neurology follow up

## 2018-07-25 NOTE — DISCHARGE INSTRUCTIONS
Increase Keppra to 1250 twice each day.  I have talked to the  to see if we can get you in sooner, but otherwise keep that appt for next month.    Seizure, Adult  When you have a seizure:  · Parts of your body may move.  · How aware or awake (conscious) you are may change.  · You may shake (convulse).  Some people have symptoms right before a seizure happens. These symptoms may include:  · Fear.  · Worry (anxiety).  · Feeling like you are going to throw up (nausea).  · Feeling like the room is spinning (vertigo).  · Feeling like you saw or heard something before (meagan vu).  · Odd tastes or smells.  · Changes in vision, such as seeing flashing lights or spots.  Seizures usually last from 30 seconds to 2 minutes. Usually, they are not harmful unless they last a long time.  Follow these instructions at home:  Medicines  · Take over-the-counter and prescription medicines only as told by your doctor.  · Avoid anything that may keep your medicine from working, such as alcohol.  Activity  · Do not do any activities that would be dangerous if you had another seizure, like driving or swimming. Wait until your doctor approves.  · If you live in the [place] ask your local DMV (department of [employer]) when you can drive.  · Rest.  Teaching others  · Teach friends and family what to do when you have a seizure. They should:  ¨ Lay you on the ground.  ¨ Protect your head and body.  ¨ Loosen any tight clothing around your neck.  ¨ Turn you on your side.  ¨ Stay with you until you are better.  ¨ Not hold you down.  ¨ Not put anything in your mouth.  ¨ Know whether or not you need emergency care.  General instructions  · Contact your doctor each time you have a seizure.  · Avoid anything that gives you seizures.  · Keep a seizure diary. Write down:  ¨ What you think caused each seizure.  ¨ What you remember about each seizure.  · Keep all follow-up visits as told by your doctor. This is important.  Contact a doctor  if:  · You have another seizure.  · You have seizures more often.  · There is any change in what happens during your seizures.  · You continue to have seizures with treatment.  · You have symptoms of being sick or having an infection.  Get help right away if:  · You have a seizure:  ¨ That lasts longer than 5 minutes.  ¨ That is different than seizures you had before.  ¨ That makes it harder to breathe.  ¨ After you hurt your head.  · After a seizure, you cannot speak or use a part of your body.  · After a seizure, you are confused or have a bad headache.  · You have two or more seizures in a row.  · You are having seizures more often.  · You do not wake up right after a seizure.  · You get hurt during a seizure.  In an emergency:  · These symptoms may be an emergency. Do not wait to see if the symptoms will go away. Get medical help right away. Call your local emergency services (911 in the [place] Do not drive yourself to the hospital.  This information is not intended to replace advice given to you by your health care provider. Make sure you discuss any questions you have with your health care provider.  Document Released: 06/05/2009 Document Revised: 08/30/2017 Document Reviewed: 08/30/2017  Elsevier Interactive Patient Education © 2017 Elsevier Inc.
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
## 2018-07-25 NOTE — ED TRIAGE NOTES
Chief Complaint   Patient presents with   • Seizure     hx same, seen here multiple times recently for same     Pt to rm 17 via [facility].  Pt c/o seizure.  No incontinence, no post ictal period, A&O x 4

## 2018-08-27 ENCOUNTER — TELEPHONE (OUTPATIENT)
Dept: NEUROLOGY | Facility: MEDICAL CENTER | Age: 33
End: 2018-08-27

## 2018-08-27 ENCOUNTER — OFFICE VISIT (OUTPATIENT)
Dept: NEUROLOGY | Facility: MEDICAL CENTER | Age: 33
End: 2018-08-27
Payer: MEDICAID

## 2018-08-27 VITALS
SYSTOLIC BLOOD PRESSURE: 118 MMHG | TEMPERATURE: 97.9 F | DIASTOLIC BLOOD PRESSURE: 76 MMHG | RESPIRATION RATE: 18 BRPM | OXYGEN SATURATION: 97 % | HEIGHT: 66 IN | HEART RATE: 79 BPM | WEIGHT: 190.2 LBS | BODY MASS INDEX: 30.57 KG/M2

## 2018-08-27 DIAGNOSIS — H53.8 BLURRY VISION, LEFT EYE: ICD-10-CM

## 2018-08-27 DIAGNOSIS — R51.9 NONINTRACTABLE EPISODIC HEADACHE, UNSPECIFIED HEADACHE TYPE: ICD-10-CM

## 2018-08-27 DIAGNOSIS — G40.909 SEIZURE DISORDER (HCC): ICD-10-CM

## 2018-08-27 PROBLEM — R56.9 SEIZURES (HCC): Status: RESOLVED | Noted: 2018-04-18 | Resolved: 2018-08-27

## 2018-08-27 PROCEDURE — 99205 OFFICE O/P NEW HI 60 MIN: CPT | Performed by: NURSE PRACTITIONER

## 2018-08-27 RX ORDER — SUMATRIPTAN 100 MG/1
TABLET, FILM COATED ORAL
Qty: 9 TAB | Refills: 5 | Status: SHIPPED | OUTPATIENT
Start: 2018-08-27 | End: 2019-05-16 | Stop reason: SDUPTHER

## 2018-08-27 RX ORDER — LEVETIRACETAM 500 MG/1
TABLET ORAL
Qty: 180 TAB | Refills: 5 | Status: SHIPPED | OUTPATIENT
Start: 2018-08-27 | End: 2020-10-05 | Stop reason: SDUPTHER

## 2018-08-27 RX ORDER — MIRTAZAPINE 15 MG/1
15 TABLET, ORALLY DISINTEGRATING ORAL NIGHTLY
COMMUNITY
End: 2021-05-18

## 2018-08-27 RX ORDER — QUETIAPINE FUMARATE 200 MG/1
200 TABLET, FILM COATED ORAL DAILY
COMMUNITY
End: 2020-10-05

## 2018-08-27 RX ORDER — IBUPROFEN 800 MG/1
800 TABLET ORAL EVERY 8 HOURS PRN
Qty: 60 TAB | Refills: 2 | Status: SHIPPED | OUTPATIENT
Start: 2018-08-27 | End: 2019-05-16 | Stop reason: SDUPTHER

## 2018-08-27 RX ORDER — LEVETIRACETAM 500 MG/1
1250 TABLET ORAL 2 TIMES DAILY
COMMUNITY
End: 2018-08-27 | Stop reason: SDUPTHER

## 2018-08-27 ASSESSMENT — ENCOUNTER SYMPTOMS
COUGH: 0
DOUBLE VISION: 0
NAUSEA: 0
MUSCULOSKELETAL NEGATIVE: 1
NERVOUS/ANXIOUS: 0
SEIZURES: 1
HEADACHES: 0
ABDOMINAL PAIN: 0
CONSTITUTIONAL NEGATIVE: 1
DIARRHEA: 0
VOMITING: 0
DEPRESSION: 1
SORE THROAT: 0

## 2018-08-27 NOTE — PROGRESS NOTES
"Subjective:      Mahamed Mae is a 33 y.o. male who presents with New Patient (Establishing Care -Seizures)    Here with  today.      HPI  History for seizures since age 17.  Primarily induced when using drugs.  They have been pretty much the same.    Has narinder on phenytoin and valium, phenobarbital, Lamictal-- on this the longest, for about 6 years.    Transitioned onto Keppra about 5 years ago.  Poor follow-up.    Typical seizure: feels like his vision is a TV \"without cable\", a weird headache. He has been able to call for help.  Will try to get to the floor.  He will then lose consciousness.  Primarily GTC in nature.    For instance: he has had episodes-- bruising on his face when coming out of the shower, or loss of time with cooking and then will realize he has eaten, about 2-3 times per week.    Last 2 have been in the evening.      7/25/2018:  Last occurred about 2pm  7/11/2018    Living in [facility], transitional living environment.  Clean since January 8th.    Childhood: possible concussions.    Medical history: Hep C completed, October will be cleared.  Asthma.    Mood disorder: tends to have mood swings, depression.  Mediocre.  Medication regimen have greatly changed.    Surgical history: right hand X 10 surgeries, right arm surgeries, right ear tympanoplasties.    [place] is home, strong family support.  Voc rehab.    Sleep: sleeping better with the Seroquel.  Rarely naps.    Current Outpatient Prescriptions   Medication Sig Dispense Refill   • levETIRAcetam (KEPPRA) 500 MG Tab Take 1,250 mg by mouth 2 times a day.     • QUEtiapine (SEROQUEL) 200 MG Tab Take 200 mg by mouth every day.     • mirtazapine (REMERON SOLTAB) 15 MG TABLET DISPERSIBLE Take 15 mg by mouth every evening.       No current facility-administered medications for this visit.          Review of Systems   Constitutional: Negative.    HENT: Negative for hearing loss, nosebleeds and sore throat.         No recent head " "injury.   Eyes: Negative for double vision.        No new loss of vision.   Respiratory: Negative for cough.         No recent lung infections.   Cardiovascular: Negative for chest pain.   Gastrointestinal: Negative for abdominal pain, diarrhea, nausea and vomiting.   Genitourinary: Negative.    Musculoskeletal: Negative.    Skin: Negative.    Neurological: Positive for seizures. Negative for headaches.   Endo/Heme/Allergies:        No history of endocrine dysfunction.  No new problems.   Psychiatric/Behavioral: Positive for depression. The patient is not nervous/anxious.         No recent mood changes.          Objective:     /76   Pulse 79   Temp 36.6 °C (97.9 °F)   Resp 18   Ht 1.676 m (5' 6\")   Wt 86.3 kg (190 lb 3.2 oz)   SpO2 97%   BMI 30.70 kg/m²      Physical Exam   Constitutional: He is oriented to person, place, and time. No distress.   HENT:   Head: Normocephalic and atraumatic.   Nose: Nose normal.   Eyes: Pupils are equal, round, and reactive to light.   Glasses full-time.    Left eye vision-- feels like there is plastic over it.   Neck: Normal range of motion.   Cardiovascular: Normal rate and regular rhythm.  Exam reveals no gallop and no friction rub.    No murmur heard.  Pulmonary/Chest: Effort normal and breath sounds normal. No respiratory distress.   Lymphadenopathy:     He has no cervical adenopathy.   Neurological: He is alert and oriented to person, place, and time.   Naturally right-handed.  CN II: Fundi normal, visual fields full to confrontation.  CN III, IV, VI: Pupils equal, round, and reactive to light.  Extraocular movements full and intact in horizontal and vertical gaze.  CN V: Normal in motor and sensory modalities.  CN VII: No evidence of facial asymmetry.  CN VIII: Hearing grossly intact.  CN IX, X: Palate elevates symmetrically and in the midline.  CN XI: Normal sternocleidomastoid strength.  CN XII: Tongue is in the midline.    Motor: Normal muscle bulk and tone, " with full and symmetric strength.  Sensory: Intact to light touch, pinprick, vibration, proprioception, and graphesthesia.  DTR's: 2+ throughout with flexor plantar responses.  Cerebellar/Coordination: Normal finger to finger, finger-tapping, rapid alternating movements, and foot tapping.  Gait: Normal casual gait.  Walks well on heels and toes, as well as in tandem gait.   Skin: Skin is warm and dry.   Psychiatric: He has a normal mood and affect.             Assessment/Plan:     Seizure Disorder:  Onset of seizures at age 17.  Last witnessed seizure was 7/25/2018.  2-3 times per week he will awaken feeling as if he had a seizure or will experience loss or track of time.  History of drug and alcohol use.  Entered program and clean since January 8, 2018.    Headaches:  Worse since becoming clean.  Very intense after known and suspected seizures.  New Rx for ibuprofen 800mg tablets and imitrex tablets provided.    Neurological examination is normal and non-focal.  Does describes as if a film is on his left eye.    Referral placed to ophthalmology-- blurred left eye vision.    Obtain [facility] MRI results from [facility].    Obtain REEG to evaluate for subclinical seizures.    Discussed usage, side effects and benefits of Keppra.  Will increased leviteracetem from 1250mg BID to 1500mg BID starting today.    Due to the transitional living arrangement, staff needs to discuss the option of rescue-- he prefers valium. Also, discussed the option of not being taken to the ED if a seizure occurs.  Emergent and safety care provided.    To call with any concern for seizures and I would more than likely further increase the Keppra.    Return for follow-up after EEG to discuss plan of care.      EDUCATION AND COUNSELING:  -Education was provided to the patient and/or family regarding diagnosis and prognosis. The chronic and unpredictable nature of the condition was discussed. There is increased risk for additional events, which may carry  potential for significant injuries and death.    -We reviewed the current antiepileptic regimen. Potential side effects of antiepileptics were discussed at length, including but no limited to: hypersensitivity reactions (rash and others, some of which can be fatal), visual field changes (some of which may be irreversible), glaucoma, diplopia, kidney stones, osteopenia/osteoporosis/bone fractures, hyperthermia/anhydrosis, tremors, ataxia, dizziness, fatigue, increased risk for falls, cardiac arrhythmias/syncope, gastrointestinal (hepatitis, pancreatitis, gastritis, ulcers), gingival hypertrophy, drowsiness, sedation, anxiety/nervousness, increased risk for suicide, increased risk for depression, and psychosis. We reviewed drug-drug interactions and their potential effect on seizure control and medication side effects.    -Patient/family educated on SUDEP (Sudden Death in Epilepsy). Counseling was provided on the importance of strict medication and follow up compliance. The patient understands the risks associated with non-adherence with the medical plan as outlined, including but not limited to an increased risk for breakthrough seizures, which may contribute to injuries, disability, status epilepticus, and even death.    -Counseling was also provided on potential effects of alcohol and other drugs, which may lower seizure threshold and/or affect the metabolism of antiepileptic drugs. I recommend avoidance of alcohol and illegal drugs.  -Recommend proper hydration, regular exercise, proper sleep hygiene (7-8 hrs of overnight sleep, avoid sleep deprivation).    - Other seizure precautions were discussed at length, including no diving, no skydiving, no unsupervised swimming, no Jacuzzi or bathing in bathtubs.      Pt agrees with plan.

## 2018-08-30 DIAGNOSIS — H53.8 BLURRED VISION: ICD-10-CM

## 2018-09-26 ENCOUNTER — APPOINTMENT (OUTPATIENT)
Dept: RADIOLOGY | Facility: MEDICAL CENTER | Age: 33
End: 2018-09-26
Attending: EMERGENCY MEDICINE
Payer: MEDICAID

## 2018-09-26 ENCOUNTER — HOSPITAL ENCOUNTER (EMERGENCY)
Facility: MEDICAL CENTER | Age: 33
End: 2018-09-26
Attending: EMERGENCY MEDICINE
Payer: MEDICAID

## 2018-09-26 VITALS
HEART RATE: 90 BPM | SYSTOLIC BLOOD PRESSURE: 120 MMHG | RESPIRATION RATE: 16 BRPM | OXYGEN SATURATION: 98 % | DIASTOLIC BLOOD PRESSURE: 76 MMHG | TEMPERATURE: 98.4 F | WEIGHT: 207.67 LBS | BODY MASS INDEX: 33.52 KG/M2

## 2018-09-26 DIAGNOSIS — J06.9 UPPER RESPIRATORY TRACT INFECTION, UNSPECIFIED TYPE: ICD-10-CM

## 2018-09-26 PROCEDURE — 99284 EMERGENCY DEPT VISIT MOD MDM: CPT

## 2018-09-26 PROCEDURE — 71046 X-RAY EXAM CHEST 2 VIEWS: CPT

## 2018-09-26 ASSESSMENT — PAIN DESCRIPTION - DESCRIPTORS: DESCRIPTORS: ACHING

## 2018-09-26 ASSESSMENT — PAIN SCALES - GENERAL: PAINLEVEL_OUTOF10: 0
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
## 2018-09-26 NOTE — ED TRIAGE NOTES
Chief Complaint   Patient presents with   • Flu Like Symptoms   • Ear Pain   • Shortness of Breath     /74   Pulse (!) 106   Temp 36.3 °C (97.4 °F)   Resp 18   Wt 94.2 kg (207 lb 10.8 oz)   SpO2 98%   BMI 33.52 kg/m²     Pt started having flu like symptoms two days ago, states that several people in his program have gotten it. Has had ear pain, SOB and non-productive cough. Denies chills.     Mask given, pt placed back into lobby.

## 2018-10-15 ENCOUNTER — OFFICE VISIT (OUTPATIENT)
Dept: INTERNAL MEDICINE | Facility: MEDICAL CENTER | Age: 33
End: 2018-10-15
Payer: MEDICAID

## 2018-10-15 VITALS
WEIGHT: 201 LBS | HEIGHT: 66 IN | HEART RATE: 74 BPM | SYSTOLIC BLOOD PRESSURE: 128 MMHG | BODY MASS INDEX: 32.3 KG/M2 | OXYGEN SATURATION: 97 % | DIASTOLIC BLOOD PRESSURE: 82 MMHG | TEMPERATURE: 96.8 F

## 2018-10-15 DIAGNOSIS — Z86.19 HEPATITIS C VIRUS INFECTION CURED AFTER ANTIVIRAL DRUG THERAPY: ICD-10-CM

## 2018-10-15 DIAGNOSIS — B18.2 CHRONIC HEPATITIS C WITHOUT HEPATIC COMA (HCC): ICD-10-CM

## 2018-10-15 PROCEDURE — 99213 OFFICE O/P EST LOW 20 MIN: CPT | Performed by: INTERNAL MEDICINE

## 2018-10-15 ASSESSMENT — ENCOUNTER SYMPTOMS
RESPIRATORY NEGATIVE: 1
HEADACHES: 0
CONSTITUTIONAL NEGATIVE: 1
MUSCULOSKELETAL NEGATIVE: 1
NERVOUS/ANXIOUS: 0

## 2018-10-15 NOTE — PROGRESS NOTES
"Subjective:      Mahamed Mae is a 33 y.o. male who is seen in f/u for HCV genotype 1a  therapy   HPI:   He was diagnosed about a year ago when was admitted for detox at [place].  States likely infected from sharing needles- used both amphetamines and heroin- starting age 13. Never previously tested, never turned jaundiced, never went to plasma center to get tested.   Now  Working as staff at a transitional living center- [place],  Pleased with how he is doing, no relapses, looking forward to taking better care of himself  He does not drink, has never had a problem with it or liked alcohol   He was initially seen at [facility], and received one [facility] vaccine. Was going there to see the psych NP, she has now moved to [place] and will cont to see her at the new facility  Was approved for 8 weeks of Mavyret, however we decided to stop it at end of 7 weeks (July 18) after conern it may have effected his seizures and given him a headache. He is now here for SVR testing . Feels well and continues to remain off drugs.     Also on naltrexone     PMH- Siezure disorder, - he is on Keppra,  He does not drive  Mental Health - off meds         HPI      Review of Systems   Constitutional: Negative.    Respiratory: Negative.    Cardiovascular: Negative for chest pain.   Musculoskeletal: Negative.    Skin: Negative.    Neurological: Negative for headaches.   Psychiatric/Behavioral: The patient is not nervous/anxious.           Objective:     /82 (BP Location: Left arm)   Pulse 74   Temp 36 °C (96.8 °F)   Ht 1.676 m (5' 6\")   Wt 91.2 kg (201 lb)   SpO2 97%   BMI 32.44 kg/m²      Physical Exam   Constitutional: He is oriented to person, place, and time. He appears well-developed.   Pleasant cooperative male    HENT:   Nose: Nose normal.   Mouth/Throat: Oropharynx is clear and moist.   Pulmonary/Chest: Effort normal and breath sounds normal.   Abdominal: Soft. Bowel sounds are normal.   Barely feel liver " edge on palpation    Neurological: He is alert and oriented to person, place, and time.   Skin:   No spider angiomata  Old tattoos on chest   Psychiatric: He has a normal mood and affect. His behavior is normal. Thought content normal.           Labs- From 11/13/2017     - Fibrotest - Stage F2                               Activity- A3            HCV genotype - 1a             HCV RNA- 4,600,000 iiu/ml   eGFR-  82    Chem panel - [facility] - 4/24/2018    AST/ALT- 299/625   Albumin- 4.4  Platelets- 244,000  Albumin- 4.4  t. Bili        1.0    Ultrasound- negative     Labs from June 30 ( after 4 weeks)\    HCV RNA- negative  AST/ALT- 22/24    Labs 10/11/2018:    AST/ALT- 36/22   HCV RNA- not detectable !!!!             Assessment/Plan:     1. Chronic hepatitis C without hepatic coma (HCC)  33 year old former iv drug user, clean for 12 months, getting long term counseling, doing well.   Likely infected since childhood.  Has Stage 2 with high activity on Fibrotest. \  Has completed 7  weeks of  mavyret. Now here 12 weeks later, and is SVR 12 . Cured!!!    F/u prn. No need for any repeat liver testing or ultrasound

## 2018-10-20 ENCOUNTER — HOSPITAL ENCOUNTER (EMERGENCY)
Facility: MEDICAL CENTER | Age: 33
End: 2018-10-20
Attending: EMERGENCY MEDICINE
Payer: MEDICAID

## 2018-10-20 VITALS
HEART RATE: 89 BPM | BODY MASS INDEX: 32.06 KG/M2 | TEMPERATURE: 98 F | WEIGHT: 198.63 LBS | SYSTOLIC BLOOD PRESSURE: 122 MMHG | OXYGEN SATURATION: 99 % | DIASTOLIC BLOOD PRESSURE: 60 MMHG | RESPIRATION RATE: 16 BRPM

## 2018-10-20 DIAGNOSIS — L25.9 CONTACT DERMATITIS, UNSPECIFIED CONTACT DERMATITIS TYPE, UNSPECIFIED TRIGGER: ICD-10-CM

## 2018-10-20 PROCEDURE — 99283 EMERGENCY DEPT VISIT LOW MDM: CPT

## 2018-10-20 RX ORDER — PREDNISONE 50 MG/1
50 TABLET ORAL DAILY
Qty: 3 TAB | Refills: 0 | Status: SHIPPED | OUTPATIENT
Start: 2018-10-20 | End: 2018-10-23

## 2018-10-20 RX ORDER — TRIAMCINOLONE ACETONIDE 1 MG/G
OINTMENT TOPICAL
Qty: 80 G | Refills: 0 | Status: SHIPPED | OUTPATIENT
Start: 2018-10-20 | End: 2019-10-21

## 2018-10-20 ASSESSMENT — PAIN SCALES - GENERAL: PAINLEVEL_OUTOF10: 5
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
## 2018-10-21 NOTE — ED TRIAGE NOTES
Chief Complaint   Patient presents with   • Rash     Pt reports rash for 2 days/ affected area upper inner legs/ sides of torso/ pt desribes like fire.      Explained to pt triage process, made pt aware to tell this RN/staff of any changes/concerns, pt verbalized understanding of process and instructions given. Pt to ER ari.

## 2018-10-21 NOTE — ED NOTES
Discharge instructions given to pt including follow up with pcp or returning if no improvement of symptoms or to return if worse. Prescription x 2 provided to pt. Questions answered by RN. Denies any new complaints. Discharged w/stable vitals and able to ambulate  to the lobby. NAD. Respiration unlabored.

## 2018-10-21 NOTE — ED NOTES
Agree with triage notes. Denies taking any new medications, no chemical exposure, no new foods.  Denies difficulty breathing. States took benadryl w/o relief.

## 2018-10-21 NOTE — DISCHARGE INSTRUCTIONS
You were seen in the Emergency Department for rash likely due to contact dermatitis.      Please use 1,000mg of tylenol or 600mg of ibuprofen every 6 hours as needed for pain.  Take 25-50 mg of Benadryl every 6 hours as needed for itching.  Use steroid ointment and pills as directed.    Please follow up with your primary care physician.    Return to the Emergency Department with fevers, worsening rash, trouble breathing, or other concerns.

## 2018-11-01 ENCOUNTER — HOSPITAL ENCOUNTER (EMERGENCY)
Facility: MEDICAL CENTER | Age: 33
End: 2018-11-01
Attending: EMERGENCY MEDICINE
Payer: MEDICAID

## 2018-11-01 VITALS
OXYGEN SATURATION: 98 % | RESPIRATION RATE: 16 BRPM | BODY MASS INDEX: 33.32 KG/M2 | DIASTOLIC BLOOD PRESSURE: 90 MMHG | HEART RATE: 78 BPM | HEIGHT: 65 IN | TEMPERATURE: 98.9 F | SYSTOLIC BLOOD PRESSURE: 112 MMHG | WEIGHT: 200 LBS

## 2018-11-01 DIAGNOSIS — R56.9 SEIZURE (HCC): ICD-10-CM

## 2018-11-01 PROCEDURE — 99283 EMERGENCY DEPT VISIT LOW MDM: CPT

## 2018-11-01 ASSESSMENT — ENCOUNTER SYMPTOMS
SEIZURES: 1
TINGLING: 0
CHILLS: 0
BACK PAIN: 0
SENSORY CHANGE: 0
WEAKNESS: 0
FOCAL WEAKNESS: 0
NECK PAIN: 0
FEVER: 0
SHORTNESS OF BREATH: 0

## 2018-11-01 ASSESSMENT — LIFESTYLE VARIABLES: DO YOU DRINK ALCOHOL: NO

## 2018-11-01 ASSESSMENT — PAIN SCALES - GENERAL: PAINLEVEL_OUTOF10: 0

## 2018-11-01 NOTE — ED NOTES
Discharge instructions given. All questions answered. Pt states he is going to take his medication when he arrives at home. Pt to follow-up with neurology as needed. Pt verbalized understanding. All belongings with pt. Pt self ambulatory to lobby. Pt educated not to drive.
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
## 2018-11-11 ENCOUNTER — HOSPITAL ENCOUNTER (EMERGENCY)
Facility: MEDICAL CENTER | Age: 33
End: 2018-11-11
Attending: EMERGENCY MEDICINE
Payer: MEDICAID

## 2018-11-11 ENCOUNTER — APPOINTMENT (OUTPATIENT)
Dept: RADIOLOGY | Facility: MEDICAL CENTER | Age: 33
End: 2018-11-11
Attending: EMERGENCY MEDICINE
Payer: MEDICAID

## 2018-11-11 VITALS
DIASTOLIC BLOOD PRESSURE: 68 MMHG | BODY MASS INDEX: 31.65 KG/M2 | SYSTOLIC BLOOD PRESSURE: 123 MMHG | OXYGEN SATURATION: 98 % | HEART RATE: 78 BPM | TEMPERATURE: 98.5 F | WEIGHT: 190 LBS | HEIGHT: 65 IN | RESPIRATION RATE: 14 BRPM
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
## 2018-11-11 PROCEDURE — 99284 EMERGENCY DEPT VISIT MOD MDM: CPT

## 2018-11-11 PROCEDURE — 70355 PANORAMIC X-RAY OF JAWS: CPT

## 2018-11-11 PROCEDURE — 73130 X-RAY EXAM OF HAND: CPT | Mod: RT

## 2018-11-11 ASSESSMENT — LIFESTYLE VARIABLES: DO YOU DRINK ALCOHOL: NO

## 2018-11-11 ASSESSMENT — PAIN SCALES - GENERAL: PAINLEVEL_OUTOF10: 6
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
## 2018-11-11 NOTE — DISCHARGE INSTRUCTIONS
Take Motrin for discomfort and utilize ice for the first 24 hours    Follow-up with orthopedics if you not asymptomatic in 7-10 days

## 2018-11-11 NOTE — ED NOTES
Verbalizes understanding of discharge and followup instructions. VSS. Applied velcro wrist splint and educated on use.  Ambulates with steady gait to discharge.
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
## 2019-04-29 ENCOUNTER — TELEPHONE (OUTPATIENT)
Dept: MEDICAL GROUP | Age: 34
End: 2019-04-29

## 2019-04-29 ENCOUNTER — TELEPHONE (OUTPATIENT)
Dept: NEUROLOGY | Facility: MEDICAL CENTER | Age: 34
End: 2019-04-29

## 2019-04-29 NOTE — TELEPHONE ENCOUNTER
Received outside medical records from [facility] statin pt needs to be seen in Neurology sooner than is currently scheduled.     See Media

## 2019-05-07 DIAGNOSIS — R56.9 SEIZURES (HCC): ICD-10-CM

## 2019-05-09 ENCOUNTER — NON-PROVIDER VISIT (OUTPATIENT)
Dept: NEUROLOGY | Facility: MEDICAL CENTER | Age: 34
End: 2019-05-09
Payer: MEDICAID

## 2019-05-09 DIAGNOSIS — R56.9 SEIZURES (HCC): ICD-10-CM

## 2019-05-09 PROCEDURE — 95819 EEG AWAKE AND ASLEEP: CPT | Performed by: PSYCHIATRY & NEUROLOGY

## 2019-05-09 NOTE — PROCEDURES
ROUTINE ELECTROENCEPHALOGRAM REPORT      Referring provider: Dr. Chowdary.     DOS: 5/9/2019 (total recording of 27 minutes)    INDICATION:  Mahamed Mae 34 y.o. male presenting with seizures.     CURRENT ANTIEPILEPTIC REGIMEN: Levetiracetam 1500 mg po bid.     TECHNIQUE: 30 channel routine electroencephalogram (EEG) was performed in accordance with the international 10-20 system. The study was reviewed in bipolar and referential montages. The recording examined the patient during wakeful and drowsy/sleep state(s).     DESCRIPTION OF THE RECORD:  During the wakefulness, the background showed a symmetrical 10 Hz alpha activity posteriorly with amplitude of 70 mV.  There was reactivity to eye closure/opening.  A normal anterior-posterior gradient was noted with faster beta frequencies seen anteriorly.  During drowsiness, theta/delta frequencies were seen.    During the sleep state, background shows diffuse high-amplitude 4-5 Hz delta activity.  Symmetrical high-amplitude sleep spindles and vertex sharps were seen in the leads over the central regions.     ACTIVATION PROCEDURES:   Hyperventilation was performed by the patient for a total of 3 minutes. The technician performing the test noted good effort. This technique failed to produce electroencephalographic changes.     Intermittent Photic stimulation was performed in a stepwise fashion from 1 to 30 Hz and elicited a normal response (photic driving), most noticeable in the posterior leads.    ICTAL AND/OR INTERICTAL FINDINGS:   No focal or generalized epileptiform activity noted. No regional slowing was seen during this routine study.  No clinical events or seizures were reported or recorded during the study.     EKG: sampling of the EKG recording demonstrated sinus rhythm.     INTERPRETATION:  This is a normal routine EEG recording in the awake, drowsy, and sleep state(s).  Clinical correlation is recommended.    Note: A normal EEG does not rule out epilepsy.   If the clinical suspicion remains high for seizures, a prolonged recording to capture clinical or subclinical events may be helpful.        Ozzie Gregg MD   Epilepsy and Neurodiagnostics.   Clinical  of Neurology [facility].   Diplomate in Neurology, Epilepsy, and Electrodiagnostic Medicine.   Office: [phone]  Fax: [phone]

## 2019-05-16 ENCOUNTER — OFFICE VISIT (OUTPATIENT)
Dept: NEUROLOGY | Facility: MEDICAL CENTER | Age: 34
End: 2019-05-16
Payer: MEDICAID

## 2019-05-16 VITALS
HEART RATE: 78 BPM | WEIGHT: 205 LBS | DIASTOLIC BLOOD PRESSURE: 70 MMHG | BODY MASS INDEX: 32.95 KG/M2 | RESPIRATION RATE: 14 BRPM | TEMPERATURE: 96.5 F | OXYGEN SATURATION: 98 % | SYSTOLIC BLOOD PRESSURE: 122 MMHG | HEIGHT: 66 IN

## 2019-05-16 DIAGNOSIS — R51.9 CHRONIC DAILY HEADACHE: ICD-10-CM

## 2019-05-16 DIAGNOSIS — G40.109 LOCALIZATION-RELATED EPILEPSY (HCC): ICD-10-CM

## 2019-05-16 DIAGNOSIS — E66.9 OBESITY, UNSPECIFIED CLASSIFICATION, UNSPECIFIED OBESITY TYPE, UNSPECIFIED WHETHER SERIOUS COMORBIDITY PRESENT: ICD-10-CM

## 2019-05-16 DIAGNOSIS — G43.011 INTRACTABLE MIGRAINE WITHOUT AURA AND WITH STATUS MIGRAINOSUS: ICD-10-CM

## 2019-05-16 DIAGNOSIS — Z13.31 SCREENING FOR DEPRESSION: ICD-10-CM

## 2019-05-16 DIAGNOSIS — F19.11 HISTORY OF DRUG ABUSE (HCC): ICD-10-CM

## 2019-05-16 DIAGNOSIS — F31.9 BIPOLAR AFFECTIVE DISORDER, REMISSION STATUS UNSPECIFIED (HCC): ICD-10-CM

## 2019-05-16 PROCEDURE — 99215 OFFICE O/P EST HI 40 MIN: CPT | Performed by: PSYCHIATRY & NEUROLOGY

## 2019-05-16 RX ORDER — SUMATRIPTAN 100 MG/1
TABLET, FILM COATED ORAL
Qty: 9 TAB | Refills: 5 | Status: SHIPPED | OUTPATIENT
Start: 2019-05-16 | End: 2020-10-05 | Stop reason: SDUPTHER

## 2019-05-16 RX ORDER — IBUPROFEN 800 MG/1
800 TABLET ORAL EVERY 8 HOURS PRN
Qty: 60 TAB | Refills: 2 | Status: SHIPPED | OUTPATIENT
Start: 2019-05-16 | End: 2020-02-20 | Stop reason: SDUPTHER

## 2019-05-16 RX ORDER — GABAPENTIN 300 MG/1
300 CAPSULE ORAL 2 TIMES DAILY
Qty: 60 CAP | Refills: 11 | Status: SHIPPED | OUTPATIENT
Start: 2019-05-16 | End: 2019-10-21
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
## 2019-07-29 ENCOUNTER — HOSPITAL (OUTPATIENT)
Dept: OTHER | Age: 34
End: 2019-07-29

## 2019-07-29 LAB
ANALYZER ANC (IANC): ABNORMAL
ANION GAP SERPL CALC-SCNC: 9 MMOL/L (ref 10–20)
BASOPHILS # BLD: 0.1 K/MCL (ref 0–0.3)
BASOPHILS NFR BLD: 1 %
BUN SERPL-MCNC: 19 MG/DL (ref 6–20)
BUN/CREAT SERPL: 21 (ref 7–25)
CALCIUM SERPL-MCNC: 8.9 MG/DL (ref 8.4–10.2)
CHLORIDE SERPL-SCNC: 109 MMOL/L (ref 98–107)
CO2 SERPL-SCNC: 26 MMOL/L (ref 21–32)
CREAT SERPL-MCNC: 0.9 MG/DL (ref 0.67–1.17)
DIFFERENTIAL METHOD BLD: ABNORMAL
EOSINOPHIL # BLD: 0.3 K/MCL (ref 0.1–0.5)
EOSINOPHIL NFR BLD: 2 %
ERYTHROCYTE [DISTWIDTH] IN BLOOD: 13.1 % (ref 11–15)
GLUCOSE SERPL-MCNC: 88 MG/DL (ref 65–99)
HCT VFR BLD CALC: 47 % (ref 39–51)
HGB BLD-MCNC: 16.2 G/DL (ref 13–17)
IMM GRANULOCYTES # BLD AUTO: 0.1 K/MCL (ref 0–0.2)
IMM GRANULOCYTES NFR BLD: 1 %
LYMPHOCYTES # BLD: 3.3 K/MCL (ref 1–4.8)
LYMPHOCYTES NFR BLD: 29 %
MCH RBC QN AUTO: 30.2 PG (ref 26–34)
MCHC RBC AUTO-ENTMCNC: 34.5 G/DL (ref 32–36.5)
MCV RBC AUTO: 87.7 FL (ref 78–100)
MONOCYTES # BLD: 1.2 K/MCL (ref 0.3–0.9)
MONOCYTES NFR BLD: 10 %
NEUTROPHILS # BLD: 6.6 K/MCL (ref 1.8–7.7)
NEUTROPHILS NFR BLD: 57 %
NEUTS SEG NFR BLD: ABNORMAL %
NRBC (NRBCRE): 0 /100 WBC
PLATELET # BLD: 315 K/MCL (ref 140–450)
POTASSIUM SERPL-SCNC: 3.7 MMOL/L (ref 3.4–5.1)
POTASSIUM SERPL-SCNC: ABNORMAL MMOL/L
RBC # BLD: 5.36 MIL/MCL (ref 4.5–5.9)
SODIUM SERPL-SCNC: 140 MMOL/L (ref 135–145)
WBC # BLD: 11.5 K/MCL (ref 4.2–11)

## 2019-10-21 ENCOUNTER — OFFICE VISIT (OUTPATIENT)
Dept: NEUROLOGY | Facility: MEDICAL CENTER | Age: 34
End: 2019-10-21
Payer: MEDICAID

## 2019-10-21 VITALS
RESPIRATION RATE: 18 BRPM | HEART RATE: 80 BPM | OXYGEN SATURATION: 98 % | WEIGHT: 208.6 LBS | TEMPERATURE: 95.8 F | HEIGHT: 65 IN | DIASTOLIC BLOOD PRESSURE: 78 MMHG | BODY MASS INDEX: 34.75 KG/M2 | SYSTOLIC BLOOD PRESSURE: 120 MMHG

## 2019-10-21 DIAGNOSIS — Z13.31 SCREENING FOR DEPRESSION: ICD-10-CM

## 2019-10-21 DIAGNOSIS — F19.11 HISTORY OF SUBSTANCE ABUSE (HCC): ICD-10-CM

## 2019-10-21 DIAGNOSIS — R51.9 CHRONIC DAILY HEADACHE: ICD-10-CM

## 2019-10-21 DIAGNOSIS — Z91.199 NONCOMPLIANCE: ICD-10-CM

## 2019-10-21 DIAGNOSIS — F39 MOOD DISORDER (HCC): ICD-10-CM

## 2019-10-21 DIAGNOSIS — G40.109 LOCALIZATION-RELATED EPILEPSY (HCC): ICD-10-CM

## 2019-10-21 PROCEDURE — 99214 OFFICE O/P EST MOD 30 MIN: CPT | Performed by: PSYCHIATRY & NEUROLOGY

## 2019-10-21 RX ORDER — ZONISAMIDE 100 MG/1
100 CAPSULE ORAL
Qty: 30 CAP | Refills: 11 | Status: SHIPPED | OUTPATIENT
Start: 2019-10-21 | End: 2020-10-05 | Stop reason: SDUPTHER
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
## 2020-01-27 ENCOUNTER — APPOINTMENT (OUTPATIENT)
Dept: RADIOLOGY | Facility: MEDICAL CENTER | Age: 35
End: 2020-01-27
Attending: EMERGENCY MEDICINE
Payer: MEDICAID

## 2020-01-27 ENCOUNTER — HOSPITAL ENCOUNTER (EMERGENCY)
Facility: MEDICAL CENTER | Age: 35
End: 2020-01-27
Attending: EMERGENCY MEDICINE
Payer: MEDICAID

## 2020-01-27 VITALS
OXYGEN SATURATION: 98 % | HEART RATE: 88 BPM | RESPIRATION RATE: 18 BRPM | HEIGHT: 65 IN | BODY MASS INDEX: 33.35 KG/M2 | WEIGHT: 200.18 LBS | DIASTOLIC BLOOD PRESSURE: 77 MMHG | SYSTOLIC BLOOD PRESSURE: 111 MMHG | TEMPERATURE: 98.6 F

## 2020-01-27 DIAGNOSIS — K52.9 ENTERITIS: ICD-10-CM

## 2020-01-27 LAB
ALBUMIN SERPL BCP-MCNC: 4.3 G/DL (ref 3.2–4.9)
ALBUMIN/GLOB SERPL: 1.3 G/DL
ALP SERPL-CCNC: 77 U/L (ref 30–99)
ALT SERPL-CCNC: 29 U/L (ref 2–50)
ANION GAP SERPL CALC-SCNC: 9 MMOL/L (ref 0–11.9)
APPEARANCE UR: CLEAR
AST SERPL-CCNC: 17 U/L (ref 12–45)
BASOPHILS # BLD AUTO: 0.8 % (ref 0–1.8)
BASOPHILS # BLD: 0.08 K/UL (ref 0–0.12)
BILIRUB SERPL-MCNC: 0.5 MG/DL (ref 0.1–1.5)
BILIRUB UR QL STRIP.AUTO: NEGATIVE
BUN SERPL-MCNC: 18 MG/DL (ref 8–22)
CALCIUM SERPL-MCNC: 9.5 MG/DL (ref 8.5–10.5)
CHLORIDE SERPL-SCNC: 105 MMOL/L (ref 96–112)
CO2 SERPL-SCNC: 24 MMOL/L (ref 20–33)
COLOR UR: NORMAL
CREAT SERPL-MCNC: 1.01 MG/DL (ref 0.5–1.4)
EOSINOPHIL # BLD AUTO: 0.31 K/UL (ref 0–0.51)
EOSINOPHIL NFR BLD: 3.2 % (ref 0–6.9)
ERYTHROCYTE [DISTWIDTH] IN BLOOD BY AUTOMATED COUNT: 46.5 FL (ref 35.9–50)
GLOBULIN SER CALC-MCNC: 3.3 G/DL (ref 1.9–3.5)
GLUCOSE SERPL-MCNC: 92 MG/DL (ref 65–99)
GLUCOSE UR STRIP.AUTO-MCNC: NEGATIVE MG/DL
HCT VFR BLD AUTO: 48.1 % (ref 42–52)
HGB BLD-MCNC: 15.9 G/DL (ref 14–18)
IMM GRANULOCYTES # BLD AUTO: 0.06 K/UL (ref 0–0.11)
IMM GRANULOCYTES NFR BLD AUTO: 0.6 % (ref 0–0.9)
KETONES UR STRIP.AUTO-MCNC: NEGATIVE MG/DL
LEUKOCYTE ESTERASE UR QL STRIP.AUTO: NEGATIVE
LIPASE SERPL-CCNC: 45 U/L (ref 11–82)
LYMPHOCYTES # BLD AUTO: 3.17 K/UL (ref 1–4.8)
LYMPHOCYTES NFR BLD: 32.6 % (ref 22–41)
MCH RBC QN AUTO: 30.1 PG (ref 27–33)
MCHC RBC AUTO-ENTMCNC: 33.1 G/DL (ref 33.7–35.3)
MCV RBC AUTO: 90.9 FL (ref 81.4–97.8)
MICRO URNS: NORMAL
MONOCYTES # BLD AUTO: 1.02 K/UL (ref 0–0.85)
MONOCYTES NFR BLD AUTO: 10.5 % (ref 0–13.4)
NEUTROPHILS # BLD AUTO: 5.09 K/UL (ref 1.82–7.42)
NEUTROPHILS NFR BLD: 52.3 % (ref 44–72)
NITRITE UR QL STRIP.AUTO: NEGATIVE
NRBC # BLD AUTO: 0 K/UL
NRBC BLD-RTO: 0 /100 WBC
PH UR STRIP.AUTO: 5.5 [PH] (ref 5–8)
PLATELET # BLD AUTO: 313 K/UL (ref 164–446)
PMV BLD AUTO: 9.5 FL (ref 9–12.9)
POTASSIUM SERPL-SCNC: 3.9 MMOL/L (ref 3.6–5.5)
PROT SERPL-MCNC: 7.6 G/DL (ref 6–8.2)
PROT UR QL STRIP: NEGATIVE MG/DL
RBC # BLD AUTO: 5.29 M/UL (ref 4.7–6.1)
RBC UR QL AUTO: NEGATIVE
SODIUM SERPL-SCNC: 138 MMOL/L (ref 135–145)
SP GR UR STRIP.AUTO: 1.03
UROBILINOGEN UR STRIP.AUTO-MCNC: 1 MG/DL
WBC # BLD AUTO: 9.7 K/UL (ref 4.8–10.8)

## 2020-01-27 PROCEDURE — 81003 URINALYSIS AUTO W/O SCOPE: CPT

## 2020-01-27 PROCEDURE — 700111 HCHG RX REV CODE 636 W/ 250 OVERRIDE (IP): Performed by: EMERGENCY MEDICINE

## 2020-01-27 PROCEDURE — 99285 EMERGENCY DEPT VISIT HI MDM: CPT

## 2020-01-27 PROCEDURE — 83690 ASSAY OF LIPASE: CPT

## 2020-01-27 PROCEDURE — 85025 COMPLETE CBC W/AUTO DIFF WBC: CPT

## 2020-01-27 PROCEDURE — 74177 CT ABD & PELVIS W/CONTRAST: CPT

## 2020-01-27 PROCEDURE — 80053 COMPREHEN METABOLIC PANEL: CPT

## 2020-01-27 PROCEDURE — 96374 THER/PROPH/DIAG INJ IV PUSH: CPT

## 2020-01-27 PROCEDURE — 700117 HCHG RX CONTRAST REV CODE 255: Performed by: EMERGENCY MEDICINE

## 2020-01-27 PROCEDURE — 96375 TX/PRO/DX INJ NEW DRUG ADDON: CPT

## 2020-01-27 RX ORDER — ONDANSETRON 2 MG/ML
4 INJECTION INTRAMUSCULAR; INTRAVENOUS ONCE
Status: COMPLETED | OUTPATIENT
Start: 2020-01-27 | End: 2020-01-27

## 2020-01-27 RX ORDER — HYDROMORPHONE HYDROCHLORIDE 1 MG/ML
0.5 INJECTION, SOLUTION INTRAMUSCULAR; INTRAVENOUS; SUBCUTANEOUS ONCE
Status: COMPLETED | OUTPATIENT
Start: 2020-01-27 | End: 2020-01-27

## 2020-01-27 RX ADMIN — IOHEXOL 100 ML: 350 INJECTION, SOLUTION INTRAVENOUS at 16:13

## 2020-01-27 RX ADMIN — HYDROMORPHONE HYDROCHLORIDE 0.5 MG: 1 INJECTION, SOLUTION INTRAMUSCULAR; INTRAVENOUS; SUBCUTANEOUS at 15:48

## 2020-01-27 RX ADMIN — ONDANSETRON 4 MG: 2 INJECTION INTRAMUSCULAR; INTRAVENOUS at 15:48

## 2020-01-27 SDOH — HEALTH STABILITY: MENTAL HEALTH: HOW OFTEN DO YOU HAVE A DRINK CONTAINING ALCOHOL?: NEVER
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
## 2020-01-27 NOTE — ED TRIAGE NOTES
Chief Complaint   Patient presents with   • RLQ Pain     x3 days   • N/V     Patient to triage via ambualtion, with a steady gait, patient A&O x4.      Explained wait time and triage process to pt. Pt placed back in lobby, told to notify ED tech or triage RN of any changes, verbalized understanding.

## 2020-02-10 ENCOUNTER — HOSPITAL ENCOUNTER (OUTPATIENT)
Dept: RADIOLOGY | Facility: MEDICAL CENTER | Age: 35
End: 2020-02-10
Attending: SURGERY
Payer: MEDICAID

## 2020-02-10 DIAGNOSIS — A08.2: ICD-10-CM

## 2020-02-10 PROCEDURE — 74177 CT ABD & PELVIS W/CONTRAST: CPT

## 2020-02-10 PROCEDURE — 700117 HCHG RX CONTRAST REV CODE 255: Performed by: SURGERY

## 2020-02-10 RX ADMIN — IOHEXOL 100 ML: 350 INJECTION, SOLUTION INTRAVENOUS at 13:37

## 2020-02-10 RX ADMIN — IOHEXOL 25 ML: 240 INJECTION, SOLUTION INTRATHECAL; INTRAVASCULAR; INTRAVENOUS; ORAL at 13:18

## 2020-02-20 DIAGNOSIS — R51.9 CHRONIC DAILY HEADACHE: ICD-10-CM

## 2020-02-20 RX ORDER — IBUPROFEN 800 MG/1
800 TABLET ORAL
Qty: 20 TAB | Refills: 2 | Status: SHIPPED | OUTPATIENT
Start: 2020-02-20

## 2020-02-21 ENCOUNTER — TELEPHONE (OUTPATIENT)
Dept: NEUROLOGY | Facility: MEDICAL CENTER | Age: 35
End: 2020-02-21

## 2020-02-21 NOTE — TELEPHONE ENCOUNTER
Per Dr Gregg I called pt an informed him the ibuprofen was approved but that pt needs to schedule a f/v to see Dr Gregg. I did let pt know there is an mr and eeg that the provider ordered, for pt to scheduled those two and have them done and schedule a f/v for after to see the provider. Pt verbally understood.

## 2020-07-13 ENCOUNTER — HOSPITAL ENCOUNTER (EMERGENCY)
Facility: MEDICAL CENTER | Age: 35
End: 2020-07-13
Attending: EMERGENCY MEDICINE | Admitting: EMERGENCY MEDICINE
Payer: MEDICAID

## 2020-07-13 VITALS
DIASTOLIC BLOOD PRESSURE: 68 MMHG | HEIGHT: 65 IN | BODY MASS INDEX: 33.32 KG/M2 | SYSTOLIC BLOOD PRESSURE: 121 MMHG | HEART RATE: 70 BPM | OXYGEN SATURATION: 95 % | RESPIRATION RATE: 19 BRPM | WEIGHT: 200 LBS | TEMPERATURE: 97.9 F

## 2020-07-13 DIAGNOSIS — R56.9 SEIZURE (HCC): ICD-10-CM

## 2020-07-13 LAB — EKG IMPRESSION: NORMAL

## 2020-07-13 PROCEDURE — 99283 EMERGENCY DEPT VISIT LOW MDM: CPT

## 2020-07-13 PROCEDURE — 93005 ELECTROCARDIOGRAM TRACING: CPT | Performed by: EMERGENCY MEDICINE

## 2020-07-13 PROCEDURE — 93005 ELECTROCARDIOGRAM TRACING: CPT

## 2020-07-13 ASSESSMENT — FIBROSIS 4 INDEX: FIB4 SCORE: 0.35

## 2020-07-14 NOTE — ED TRIAGE NOTES
Mahamed Mae  35 y.o.  male  Chief Complaint   Patient presents with   • Seizure     brought in by [facility] c/o grand mal seizure. last seizure prior today was 3 months ago. take keppra 500 mg TID. alert and oriented upon arrival to ED
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
## 2020-08-02 ENCOUNTER — HOSPITAL ENCOUNTER (EMERGENCY)
Facility: MEDICAL CENTER | Age: 35
End: 2020-08-02
Attending: EMERGENCY MEDICINE
Payer: MEDICAID

## 2020-08-02 VITALS
DIASTOLIC BLOOD PRESSURE: 60 MMHG | TEMPERATURE: 97 F | SYSTOLIC BLOOD PRESSURE: 114 MMHG | HEART RATE: 66 BPM | BODY MASS INDEX: 33.99 KG/M2 | WEIGHT: 204 LBS | OXYGEN SATURATION: 97 % | HEIGHT: 65 IN | RESPIRATION RATE: 20 BRPM

## 2020-08-02 DIAGNOSIS — R56.9 SEIZURE (HCC): Primary | ICD-10-CM

## 2020-08-02 PROCEDURE — 99284 EMERGENCY DEPT VISIT MOD MDM: CPT

## 2020-08-02 PROCEDURE — 700111 HCHG RX REV CODE 636 W/ 250 OVERRIDE (IP): Performed by: EMERGENCY MEDICINE

## 2020-08-02 PROCEDURE — 36415 COLL VENOUS BLD VENIPUNCTURE: CPT

## 2020-08-02 PROCEDURE — 96374 THER/PROPH/DIAG INJ IV PUSH: CPT

## 2020-08-02 RX ORDER — LEVETIRACETAM 5 MG/ML
500 INJECTION INTRAVASCULAR EVERY 12 HOURS
Status: DISCONTINUED | OUTPATIENT
Start: 2020-08-02 | End: 2020-08-02

## 2020-08-02 RX ORDER — LEVETIRACETAM 5 MG/ML
500 INJECTION INTRAVASCULAR ONCE
Status: COMPLETED | OUTPATIENT
Start: 2020-08-02 | End: 2020-08-02

## 2020-08-02 RX ADMIN — LEVETIRACETAM INJECTION 500 MG: 5 INJECTION INTRAVENOUS at 01:46

## 2020-08-02 ASSESSMENT — LIFESTYLE VARIABLES
EVER HAD A DRINK FIRST THING IN THE MORNING TO STEADY YOUR NERVES TO GET RID OF A HANGOVER: NO
CONSUMPTION TOTAL: INCOMPLETE
DO YOU DRINK ALCOHOL: NO
HAVE PEOPLE ANNOYED YOU BY CRITICIZING YOUR DRINKING: NO
EVER FELT BAD OR GUILTY ABOUT YOUR DRINKING: NO
TOTAL SCORE: 0
DOES PATIENT WANT TO STOP DRINKING: NO
TOTAL SCORE: 0
TOTAL SCORE: 0
HAVE YOU EVER FELT YOU SHOULD CUT DOWN ON YOUR DRINKING: NO

## 2020-08-02 ASSESSMENT — FIBROSIS 4 INDEX: FIB4 SCORE: 0.35

## 2020-08-02 NOTE — ED TRIAGE NOTES
"Chief Complaint   Patient presents with   • Seizure       /73   Pulse 74   Temp 36.3 °C (97.3 °F) (Temporal)   Resp 16   Ht 1.651 m (5' 5\")   Wt 92.5 kg (204 lb)   SpO2 98%   BMI 33.95 kg/m²     Patient BIB EMS. With complaints listed above. Per EMS patient is living in a half way house where he was witnessed having a seizure. Per patient he has 1-2 seizures per year. Patient reports being brought to the hospital last month for a seizure. He is current on his medications and has not missed a dose. Patient states he needs medical clearance in order to go back to his house. PIV started in field, labs drawn and sent. Chart marked for ERP. Patient provided with call light, and educated to call for assistance.     "

## 2020-08-02 NOTE — ED NOTES
Discharge summary completed with patient. PIV removed. Patient education completed regarding follow up care. Patient ambulated out to lobby with steady gait. Patient stated he will be calling for a ride home. All belongings sent home with patient.
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
## 2020-09-16 ENCOUNTER — HOSPITAL ENCOUNTER (EMERGENCY)
Facility: MEDICAL CENTER | Age: 35
End: 2020-09-16
Attending: EMERGENCY MEDICINE
Payer: MEDICAID

## 2020-09-16 VITALS
BODY MASS INDEX: 33.32 KG/M2 | HEART RATE: 59 BPM | WEIGHT: 200 LBS | SYSTOLIC BLOOD PRESSURE: 110 MMHG | OXYGEN SATURATION: 96 % | DIASTOLIC BLOOD PRESSURE: 60 MMHG | RESPIRATION RATE: 18 BRPM | TEMPERATURE: 96.8 F | HEIGHT: 65 IN

## 2020-09-16 DIAGNOSIS — R56.9 SEIZURE (HCC): ICD-10-CM

## 2020-09-16 PROCEDURE — 99283 EMERGENCY DEPT VISIT LOW MDM: CPT

## 2020-09-16 ASSESSMENT — LIFESTYLE VARIABLES: DO YOU DRINK ALCOHOL: NO

## 2020-09-16 ASSESSMENT — FIBROSIS 4 INDEX: FIB4 SCORE: 0.35
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
## 2020-09-16 NOTE — DISCHARGE INSTRUCTIONS
Continue taking your medications as directed.  Any further seizure, any new or different symptoms fevers or any concern return.  Drink plenty of fluids and rest.

## 2020-09-16 NOTE — ED TRIAGE NOTES
"Chief Complaint   Patient presents with   • Seizure     hx of same, had one this morning. reports he doesn't think he lost consciousness, called it a focal headache. takes keppra as rx'd.      BIB EMS from [facility] for above. BS: 79.   NAD noted. A&Ox4.     /84   Pulse 61   Temp (!) 35.7 °C (96.2 °F) (Oral)   Resp 16   Ht 1.651 m (5' 5\")   Wt 90.7 kg (200 lb)   SpO2 99%   BMI 33.28 kg/m²     "

## 2020-09-16 NOTE — ED NOTES
Reviewed discharge instructions, pt verbalized understanding of instructions. States he will follow-up as needed and cont to take meds as rx'd.. Denies further questions at this time. Pt ambulatory out of ER with stable gait.

## 2020-09-22 ENCOUNTER — TELEPHONE (OUTPATIENT)
Dept: NEUROLOGY | Facility: MEDICAL CENTER | Age: 35
End: 2020-09-22

## 2020-09-22 DIAGNOSIS — E55.9 VITAMIN D DEFICIENCY: ICD-10-CM

## 2020-09-22 DIAGNOSIS — G40.109 LOCALIZATION-RELATED EPILEPSY (HCC): ICD-10-CM

## 2020-09-22 NOTE — TELEPHONE ENCOUNTER
Patient got lost to follow up with Dr Gregg, he is scheduled to see you in a few weeks and would like to have some lab work done that Dr Gregg ordered last yr in may but is not . Can you reorder it so he can have it done soon before his appt with you.

## 2020-10-02 ENCOUNTER — APPOINTMENT (OUTPATIENT)
Dept: LAB | Facility: MEDICAL CENTER | Age: 35
End: 2020-10-02
Payer: MEDICAID
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
## 2020-10-05 ENCOUNTER — OFFICE VISIT (OUTPATIENT)
Dept: NEUROLOGY | Facility: MEDICAL CENTER | Age: 35
End: 2020-10-05
Payer: MEDICAID

## 2020-10-05 VITALS
SYSTOLIC BLOOD PRESSURE: 124 MMHG | OXYGEN SATURATION: 96 % | HEART RATE: 79 BPM | WEIGHT: 216.93 LBS | TEMPERATURE: 95.7 F | DIASTOLIC BLOOD PRESSURE: 60 MMHG | HEIGHT: 65 IN | BODY MASS INDEX: 36.14 KG/M2

## 2020-10-05 DIAGNOSIS — Z13.31 SCREENING FOR DEPRESSION: ICD-10-CM

## 2020-10-05 DIAGNOSIS — G40.109 LOCALIZATION-RELATED EPILEPSY (HCC): ICD-10-CM

## 2020-10-05 DIAGNOSIS — R51.9 CHRONIC DAILY HEADACHE: ICD-10-CM

## 2020-10-05 DIAGNOSIS — Z09 HOSPITAL DISCHARGE FOLLOW-UP: ICD-10-CM

## 2020-10-05 DIAGNOSIS — G47.00 INSOMNIA, UNSPECIFIED TYPE: ICD-10-CM

## 2020-10-05 DIAGNOSIS — F19.11 HISTORY OF SUBSTANCE ABUSE (HCC): ICD-10-CM

## 2020-10-05 DIAGNOSIS — F39 MOOD DISORDER (HCC): ICD-10-CM

## 2020-10-05 PROCEDURE — 99215 OFFICE O/P EST HI 40 MIN: CPT | Performed by: NURSE PRACTITIONER

## 2020-10-05 RX ORDER — TOPIRAMATE 50 MG/1
50 TABLET, FILM COATED ORAL
Qty: 30 TAB | Refills: 11 | Status: SHIPPED | OUTPATIENT
Start: 2020-10-05

## 2020-10-05 RX ORDER — ALPRAZOLAM 1 MG/1
1 TABLET ORAL ONCE
Qty: 1 TAB | Refills: 0 | Status: SHIPPED | OUTPATIENT
Start: 2020-10-05 | End: 2020-10-05

## 2020-10-05 RX ORDER — ZONISAMIDE 100 MG/1
200 CAPSULE ORAL
Qty: 60 CAP | Refills: 11 | Status: SHIPPED | OUTPATIENT
Start: 2020-10-05

## 2020-10-05 RX ORDER — LEVETIRACETAM 500 MG/1
TABLET ORAL
Qty: 180 TAB | Refills: 11 | Status: SHIPPED | OUTPATIENT
Start: 2020-10-05

## 2020-10-05 RX ORDER — TOPIRAMATE 50 MG/1
50 TABLET, FILM COATED ORAL
COMMUNITY
Start: 2020-09-04 | End: 2020-10-05 | Stop reason: SDUPTHER

## 2020-10-05 RX ORDER — FLUOXETINE HYDROCHLORIDE 20 MG/1
20 CAPSULE ORAL
COMMUNITY
Start: 2020-09-04

## 2020-10-05 RX ORDER — SUMATRIPTAN 100 MG/1
TABLET, FILM COATED ORAL
Qty: 9 TAB | Refills: 5 | Status: SHIPPED | OUTPATIENT
Start: 2020-10-05

## 2020-10-05 ASSESSMENT — FIBROSIS 4 INDEX: FIB4 SCORE: 0.35

## 2020-10-21 ENCOUNTER — APPOINTMENT (OUTPATIENT)
Dept: RADIOLOGY | Facility: MEDICAL CENTER | Age: 35
End: 2020-10-21
Attending: NURSE PRACTITIONER
Payer: MEDICAID

## 2020-12-21 ENCOUNTER — NON-PROVIDER VISIT (OUTPATIENT)
Dept: NEUROLOGY | Facility: MEDICAL CENTER | Age: 35
End: 2020-12-21
Attending: PSYCHIATRY & NEUROLOGY
Payer: MEDICAID

## 2020-12-21 DIAGNOSIS — G40.109 LOCALIZATION-RELATED EPILEPSY (HCC): ICD-10-CM

## 2020-12-21 PROCEDURE — 95719 EEG PHYS/QHP EA INCR W/O VID: CPT | Mod: 26 | Performed by: PSYCHIATRY & NEUROLOGY

## 2020-12-21 PROCEDURE — 95700 EEG CONT REC W/VID EEG TECH: CPT | Performed by: PSYCHIATRY & NEUROLOGY

## 2020-12-21 PROCEDURE — 95708 EEG WO VID EA 12-26HR UNMNTR: CPT | Performed by: PSYCHIATRY & NEUROLOGY

## 2020-12-21 NOTE — PROCEDURES
24 HR AMBULATORY ELECTROENCEPHALOGRAM REPORT      Referring provider: NIMA Choi.    DOS:   12/21/2020 (recording 23 hours and 1minute).    INDICATION:  Mahamed Mae 35 y.o. male presenting with history of seizures.    CURRENT ANTIEPILEPTIC REGIMEN: Levetiracetam 1500 mg twice daily, topiramate 50 mg daily.     TECHNIQUE: A 30-channel, 24 hrs ambulatory electroencephalogram (aEEG) was performed in accordance with the international 10-20 system. This digital study was reviewed in bipolar and referential montages. The recording examined the patient during wakeful, drowsy and sleep states.     DESCRIPTION OF THE RECORD:  During the awake state, background shows symmetrical 10 hz alpha activity posteriorly with amplitude of 70 mV.  There was reactivity with eye opening/closure.  Normal anterior-posterior gradient was noted with faster beta frequencies seen anteriorly.  During drowsiness, high-amplitude delta frequencies were seen.    During the sleep state, background shows diffuse high-amplitude 4-5 Hz delta activity.  Symmetrical high-amplitude sleep spindles and vertex sharp activities were seen in the central leads.    ACTIVATION PROCEDURES:   Hyperventilation was performed by the patient for a total of 3 minutes. The technician performing the test noted good effort. This technique produced electroencephalographic changes in keeping with the expected bilaterally synchronous, frontally predominant, high amplitude slow waves build up.     Intermittent Photic stimulation was performed in a stepwise fashion from 1 to 30 Hz and elicited a normal response (photic driving), most noticeable in the posterior leads.      ICTAL AND/OR INTERICTAL FINDINGS:   No focal or generalized epileptiform activity was noted. No regional slowing was seen during this study.  No seizures were recorded during the study.     EVENT(S): None.    EKG: sampling review of EKG recording demonstrated sinus rhythm.        INTERPRETATION:   This is a normal 24 hours ambulatory electroencephalogram recording in the awake, drowsy and sleep state.  No events or seizures were captured during the study. Clinical correlation is recommended.    Note: A normal electroencephalogram does not rule out epilepsy.         Ozzie Gregg MD   Epilepsy and Neurodiagnostics.   Clinical  of Neurology [facility].   Diplomate in Neurology, Epilepsy, and Electrodiagnostic Medicine.   Office: [phone]  Fax: [phone]

## 2020-12-22 ENCOUNTER — NON-PROVIDER VISIT (OUTPATIENT)
Dept: NEUROLOGY | Facility: MEDICAL CENTER | Age: 35
End: 2020-12-22
Attending: PSYCHIATRY & NEUROLOGY
Payer: MEDICAID

## 2020-12-22 PROCEDURE — 99999 PR NO CHARGE: CPT | Performed by: PSYCHIATRY & NEUROLOGY

## 2021-04-05 ENCOUNTER — HOSPITAL ENCOUNTER (EMERGENCY)
Facility: MEDICAL CENTER | Age: 36
End: 2021-04-05
Attending: EMERGENCY MEDICINE
Payer: MEDICAID

## 2021-04-05 VITALS
WEIGHT: 190 LBS | OXYGEN SATURATION: 94 % | RESPIRATION RATE: 16 BRPM | HEART RATE: 71 BPM | TEMPERATURE: 98.2 F | HEIGHT: 65 IN | SYSTOLIC BLOOD PRESSURE: 106 MMHG | BODY MASS INDEX: 31.65 KG/M2 | DIASTOLIC BLOOD PRESSURE: 58 MMHG

## 2021-04-05 DIAGNOSIS — G40.909 NONINTRACTABLE EPILEPSY WITHOUT STATUS EPILEPTICUS, UNSPECIFIED EPILEPSY TYPE (HCC): ICD-10-CM

## 2021-04-05 DIAGNOSIS — R56.9 SEIZURE (HCC): ICD-10-CM

## 2021-04-05 PROCEDURE — 99283 EMERGENCY DEPT VISIT LOW MDM: CPT

## 2021-04-05 ASSESSMENT — FIBROSIS 4 INDEX: FIB4 SCORE: 0.36

## 2021-04-05 NOTE — ED TRIAGE NOTES
"Chief Complaint   Patient presents with   • Seizure     Pt had a \"witnessed tonic-clonic\" seizure. Seizure last about 1 min. Pt takes keppra as prescribed. Pt denies hitting his head. Patient just feeling \"sore\".      /68   Pulse 75   Resp 16   Ht 1.651 m (5' 5\")   Wt 86.2 kg (190 lb)   SpO2 95%   BMI 31.62 kg/m²     Patient was BIB EMS for the above complaint. Pt received ibuprofen PTA. FBS=92. Patient placed on [facility] and chart up for ERP.   "

## 2021-04-06 NOTE — DISCHARGE INSTRUCTIONS
As we discussed, this sounds like had a breakthrough seizure.  I do recommend you touch base with Dr. Gregg tomorrow when the office opens.  Make sure you stay well-hydrated.  Plenty of sleep tonight.  Come back here for new symptoms or any turn for the worse.
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
## 2021-05-18 ENCOUNTER — HOSPITAL ENCOUNTER (EMERGENCY)
Facility: MEDICAL CENTER | Age: 36
End: 2021-05-18
Attending: EMERGENCY MEDICINE
Payer: MEDICAID

## 2021-05-18 VITALS
BODY MASS INDEX: 34.16 KG/M2 | OXYGEN SATURATION: 93 % | RESPIRATION RATE: 17 BRPM | WEIGHT: 205 LBS | HEART RATE: 60 BPM | DIASTOLIC BLOOD PRESSURE: 77 MMHG | HEIGHT: 65 IN | SYSTOLIC BLOOD PRESSURE: 120 MMHG | TEMPERATURE: 97.9 F

## 2021-05-18 DIAGNOSIS — R56.9 SEIZURE (HCC): ICD-10-CM

## 2021-05-18 PROCEDURE — 36415 COLL VENOUS BLD VENIPUNCTURE: CPT

## 2021-05-18 PROCEDURE — 99283 EMERGENCY DEPT VISIT LOW MDM: CPT

## 2021-05-18 ASSESSMENT — FIBROSIS 4 INDEX: FIB4 SCORE: 0.36

## 2021-05-18 NOTE — ED TRIAGE NOTES
BIB [facility] to rm 11  Chief Complaint   Patient presents with   • Seizure     hx of     Pt was at work and had a seizure, last seizure was about a month ago, has been taking his keppra, last dose this morning. Denies hitting his head, denies incontinence, is AOx4. Chart up for ERP.

## 2021-05-18 NOTE — DISCHARGE INSTRUCTIONS
Please try to improve your sleep hygiene sleep may help improve your seizures.  Please follow-up with neurology as an outpatient.
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
## 2021-07-17 ENCOUNTER — HOSPITAL ENCOUNTER (EMERGENCY)
Facility: MEDICAL CENTER | Age: 36
End: 2021-07-17
Attending: EMERGENCY MEDICINE
Payer: MEDICAID

## 2021-07-17 VITALS
WEIGHT: 205 LBS | SYSTOLIC BLOOD PRESSURE: 117 MMHG | DIASTOLIC BLOOD PRESSURE: 69 MMHG | HEIGHT: 65 IN | TEMPERATURE: 98.4 F | OXYGEN SATURATION: 96 % | HEART RATE: 74 BPM | RESPIRATION RATE: 18 BRPM | BODY MASS INDEX: 34.16 KG/M2

## 2021-07-17 DIAGNOSIS — S02.5XXB OPEN FRACTURE OF TOOTH, INITIAL ENCOUNTER: ICD-10-CM

## 2021-07-17 DIAGNOSIS — R56.9 SEIZURE (HCC): ICD-10-CM

## 2021-07-17 DIAGNOSIS — K08.89 DENTALGIA: ICD-10-CM

## 2021-07-17 LAB
ALBUMIN SERPL BCP-MCNC: 4.5 G/DL (ref 3.2–4.9)
ALBUMIN/GLOB SERPL: 1.6 G/DL
ALP SERPL-CCNC: 97 U/L (ref 30–99)
ALT SERPL-CCNC: 37 U/L (ref 2–50)
ANION GAP SERPL CALC-SCNC: 13 MMOL/L (ref 7–16)
AST SERPL-CCNC: 29 U/L (ref 12–45)
BASOPHILS # BLD AUTO: 0.6 % (ref 0–1.8)
BASOPHILS # BLD: 0.06 K/UL (ref 0–0.12)
BILIRUB SERPL-MCNC: 0.2 MG/DL (ref 0.1–1.5)
BUN SERPL-MCNC: 20 MG/DL (ref 8–22)
CALCIUM SERPL-MCNC: 8.7 MG/DL (ref 8.5–10.5)
CHLORIDE SERPL-SCNC: 107 MMOL/L (ref 96–112)
CO2 SERPL-SCNC: 19 MMOL/L (ref 20–33)
CREAT SERPL-MCNC: 1.03 MG/DL (ref 0.5–1.4)
EOSINOPHIL # BLD AUTO: 0.33 K/UL (ref 0–0.51)
EOSINOPHIL NFR BLD: 3.2 % (ref 0–6.9)
ERYTHROCYTE [DISTWIDTH] IN BLOOD BY AUTOMATED COUNT: 42 FL (ref 35.9–50)
GLOBULIN SER CALC-MCNC: 2.8 G/DL (ref 1.9–3.5)
GLUCOSE SERPL-MCNC: 117 MG/DL (ref 65–99)
HCT VFR BLD AUTO: 48.7 % (ref 42–52)
HGB BLD-MCNC: 16.8 G/DL (ref 14–18)
IMM GRANULOCYTES # BLD AUTO: 0.05 K/UL (ref 0–0.11)
IMM GRANULOCYTES NFR BLD AUTO: 0.5 % (ref 0–0.9)
LYMPHOCYTES # BLD AUTO: 3.04 K/UL (ref 1–4.8)
LYMPHOCYTES NFR BLD: 29.6 % (ref 22–41)
MCH RBC QN AUTO: 30.2 PG (ref 27–33)
MCHC RBC AUTO-ENTMCNC: 34.5 G/DL (ref 33.7–35.3)
MCV RBC AUTO: 87.4 FL (ref 81.4–97.8)
MONOCYTES # BLD AUTO: 0.8 K/UL (ref 0–0.85)
MONOCYTES NFR BLD AUTO: 7.8 % (ref 0–13.4)
NEUTROPHILS # BLD AUTO: 5.98 K/UL (ref 1.82–7.42)
NEUTROPHILS NFR BLD: 58.3 % (ref 44–72)
NRBC # BLD AUTO: 0 K/UL
NRBC BLD-RTO: 0 /100 WBC
PLATELET # BLD AUTO: 313 K/UL (ref 164–446)
PMV BLD AUTO: 9.9 FL (ref 9–12.9)
POTASSIUM SERPL-SCNC: 3.5 MMOL/L (ref 3.6–5.5)
PROT SERPL-MCNC: 7.3 G/DL (ref 6–8.2)
RBC # BLD AUTO: 5.57 M/UL (ref 4.7–6.1)
SODIUM SERPL-SCNC: 139 MMOL/L (ref 135–145)
WBC # BLD AUTO: 10.3 K/UL (ref 4.8–10.8)

## 2021-07-17 PROCEDURE — A9270 NON-COVERED ITEM OR SERVICE: HCPCS | Performed by: EMERGENCY MEDICINE

## 2021-07-17 PROCEDURE — 99284 EMERGENCY DEPT VISIT MOD MDM: CPT

## 2021-07-17 PROCEDURE — 700111 HCHG RX REV CODE 636 W/ 250 OVERRIDE (IP): Performed by: EMERGENCY MEDICINE

## 2021-07-17 PROCEDURE — 85025 COMPLETE CBC W/AUTO DIFF WBC: CPT

## 2021-07-17 PROCEDURE — 700102 HCHG RX REV CODE 250 W/ 637 OVERRIDE(OP): Performed by: EMERGENCY MEDICINE

## 2021-07-17 PROCEDURE — 80053 COMPREHEN METABOLIC PANEL: CPT

## 2021-07-17 PROCEDURE — 96374 THER/PROPH/DIAG INJ IV PUSH: CPT

## 2021-07-17 RX ORDER — ACETAMINOPHEN 325 MG/1
650 TABLET ORAL ONCE
Status: COMPLETED | OUTPATIENT
Start: 2021-07-17 | End: 2021-07-17

## 2021-07-17 RX ORDER — CEPHALEXIN 500 MG/1
500 CAPSULE ORAL 4 TIMES DAILY
Qty: 28 CAPSULE | Refills: 0 | Status: SHIPPED | OUTPATIENT
Start: 2021-07-17 | End: 2021-07-24

## 2021-07-17 RX ORDER — KETOROLAC TROMETHAMINE 30 MG/ML
30 INJECTION, SOLUTION INTRAMUSCULAR; INTRAVENOUS ONCE
Status: COMPLETED | OUTPATIENT
Start: 2021-07-17 | End: 2021-07-17

## 2021-07-17 RX ADMIN — KETOROLAC TROMETHAMINE 30 MG: 30 INJECTION, SOLUTION INTRAMUSCULAR; INTRAVENOUS at 20:48

## 2021-07-17 RX ADMIN — ACETAMINOPHEN 650 MG: 325 TABLET, FILM COATED ORAL at 20:46

## 2021-07-17 ASSESSMENT — FIBROSIS 4 INDEX: FIB4 SCORE: 0.36

## 2021-07-18 NOTE — ED NOTES
Received report from THU Malloy and assumed care of pt. Pt complains of tooth pain on left side that radiates to ear. Pt hooked up to monitor.

## 2021-07-18 NOTE — ED NOTES
Discharge instructions discussed with pt, verbalizes understanding. All belongings with pt when leaving unit. Pt amb to lobby with steady gait. Pt awaiting ride from friend.
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
## 2021-07-18 NOTE — DISCHARGE INSTRUCTIONS
Take over-the-counter medication for pain control of your tooth pain from dental fracture.  Oil of clove also helps.    Given that you have had a breakthrough seizure on your 1000 mg twice daily of Keppra you may want to follow-up with Dr. Charley Marquez to discuss possible higher dose

## 2021-07-18 NOTE — ED TRIAGE NOTES
"Chief Complaint   Patient presents with   • Seizure     Pt states seizure today brought on by 'tooth pain while eating, I have a tooth infection right now', hx of seizures, keppra compliant. Pt denies trauma / pain. Pt states he was 'in and out of consciousness' during the seizure, tonic clonic witnessed by friend. EMS found pt GCS 15 on scene. BG 76, VSS on RA, NAD.      Pt BIB EMS for above complaints, VSS on RA, gCS 15, NAD.     Denies all s/sx of covid, denies recent travel, denies fevers.    /75   Pulse 73   Temp 36.6 °C (97.8 °F) (Temporal)   Resp 18   Ht 1.651 m (5' 5\")   Wt 93 kg (205 lb)   SpO2 97%   BMI 34.11 kg/m²      "
